# Patient Record
Sex: FEMALE | Race: WHITE | Employment: OTHER | URBAN - METROPOLITAN AREA
[De-identification: names, ages, dates, MRNs, and addresses within clinical notes are randomized per-mention and may not be internally consistent; named-entity substitution may affect disease eponyms.]

---

## 2018-05-24 PROBLEM — M76.891 ENTHESOPATHY OF RIGHT HIP: Status: ACTIVE | Noted: 2018-05-24

## 2019-03-04 PROBLEM — F41.1 GAD (GENERALIZED ANXIETY DISORDER): Status: ACTIVE | Noted: 2019-03-04

## 2019-12-19 PROBLEM — N18.30 CKD (CHRONIC KIDNEY DISEASE) STAGE 3, GFR 30-59 ML/MIN (HCC): Status: ACTIVE | Noted: 2019-12-19

## 2020-10-05 ENCOUNTER — HOSPITAL ENCOUNTER (EMERGENCY)
Facility: HOSPITAL | Age: 70
Discharge: HOME | End: 2020-10-05
Attending: EMERGENCY MEDICINE
Payer: MEDICARE

## 2020-10-05 VITALS
RESPIRATION RATE: 18 BRPM | SYSTOLIC BLOOD PRESSURE: 124 MMHG | HEIGHT: 63 IN | HEART RATE: 78 BPM | DIASTOLIC BLOOD PRESSURE: 86 MMHG | OXYGEN SATURATION: 97 % | TEMPERATURE: 99 F | BODY MASS INDEX: 26.58 KG/M2 | WEIGHT: 150 LBS

## 2020-10-05 DIAGNOSIS — R09.81 NASAL CONGESTION: Primary | ICD-10-CM

## 2020-10-05 PROCEDURE — 93005 ELECTROCARDIOGRAM TRACING: CPT

## 2020-10-05 PROCEDURE — 93010 ELECTROCARDIOGRAM REPORT: CPT

## 2020-10-05 PROCEDURE — 99284 EMERGENCY DEPT VISIT MOD MDM: CPT

## 2020-10-05 PROCEDURE — 99283 EMERGENCY DEPT VISIT LOW MDM: CPT

## 2020-10-05 NOTE — ED INITIAL ASSESSMENT (HPI)
Pt c/o head congestion, sinus pressure, headache, not feeling well. Pt hugged a friend last Monday and her friend dx +covid on Wednesday. Pt fears she has covid and wants testing.

## 2020-10-05 NOTE — ED PROVIDER NOTES
Patient Seen in: [facility] Emergency Department      History   Patient presents with:  Cough/URI  Fatigue    Stated Complaint: head congestion, covid exposure, fatigue    HPI    Patient is a 23-year-old female comes emergency room for evaluation of bilaterally. No sinus tenderness to percussion. Sinuses nontender to percussion  NECK: Supple, trachea midline, no lymphadenopathy. LUNG: Lungs clear to auscultation bilaterally, no wheezing, no rales, no rhonchi.   CARDIOVASCULAR: Regular rate and rhyt

## 2023-09-22 RX ORDER — POLYETHYLENE GLYCOL 3350 17 G/17G
17 POWDER, FOR SOLUTION ORAL DAILY
COMMUNITY

## 2023-09-22 RX ORDER — CHOLECALCIFEROL (VITAMIN D3) 50 MCG
TABLET ORAL DAILY
COMMUNITY

## 2023-09-22 RX ORDER — MULTIVIT,CALC,MINS/IRON/FOLIC 9MG-400MCG
TABLET ORAL
COMMUNITY

## 2023-09-28 ENCOUNTER — HOSPITAL ENCOUNTER (OUTPATIENT)
Facility: HOSPITAL | Age: 73
Setting detail: HOSPITAL OUTPATIENT SURGERY
Discharge: HOME | End: 2023-09-28
Attending: INTERNAL MEDICINE | Admitting: INTERNAL MEDICINE
Payer: MEDICARE

## 2023-09-28 ENCOUNTER — ANESTHESIA (OUTPATIENT)
Dept: ENDOSCOPY | Facility: HOSPITAL | Age: 73
End: 2023-09-28
Payer: MEDICARE

## 2023-09-28 ENCOUNTER — ANESTHESIA EVENT (OUTPATIENT)
Dept: ENDOSCOPY | Facility: HOSPITAL | Age: 73
End: 2023-09-28
Payer: MEDICARE

## 2023-09-28 VITALS
OXYGEN SATURATION: 98 % | DIASTOLIC BLOOD PRESSURE: 62 MMHG | TEMPERATURE: 98 F | WEIGHT: 140 LBS | HEART RATE: 64 BPM | RESPIRATION RATE: 20 BRPM | BODY MASS INDEX: 24.8 KG/M2 | SYSTOLIC BLOOD PRESSURE: 109 MMHG | HEIGHT: 63 IN

## 2023-09-28 PROCEDURE — 88305 TISSUE EXAM BY PATHOLOGIST: CPT | Performed by: INTERNAL MEDICINE

## 2023-09-28 PROCEDURE — 0DBP8ZX EXCISION OF RECTUM, VIA NATURAL OR ARTIFICIAL OPENING ENDOSCOPIC, DIAGNOSTIC: ICD-10-PCS | Performed by: INTERNAL MEDICINE

## 2023-09-28 RX ORDER — LIDOCAINE HYDROCHLORIDE 10 MG/ML
INJECTION, SOLUTION EPIDURAL; INFILTRATION; INTRACAUDAL; PERINEURAL AS NEEDED
Status: DISCONTINUED | OUTPATIENT
Start: 2023-09-28 | End: 2023-09-28 | Stop reason: SURG

## 2023-09-28 RX ORDER — SODIUM CHLORIDE, SODIUM LACTATE, POTASSIUM CHLORIDE, CALCIUM CHLORIDE 600; 310; 30; 20 MG/100ML; MG/100ML; MG/100ML; MG/100ML
INJECTION, SOLUTION INTRAVENOUS CONTINUOUS
Status: DISCONTINUED | OUTPATIENT
Start: 2023-09-28 | End: 2023-09-28

## 2023-09-28 RX ORDER — LABETALOL HYDROCHLORIDE 5 MG/ML
5 INJECTION, SOLUTION INTRAVENOUS EVERY 5 MIN PRN
Status: DISCONTINUED | OUTPATIENT
Start: 2023-09-28 | End: 2023-09-28

## 2023-09-28 RX ORDER — NALOXONE HYDROCHLORIDE 0.4 MG/ML
80 INJECTION, SOLUTION INTRAMUSCULAR; INTRAVENOUS; SUBCUTANEOUS AS NEEDED
Status: DISCONTINUED | OUTPATIENT
Start: 2023-09-28 | End: 2023-09-28

## 2023-09-28 RX ORDER — METOCLOPRAMIDE HYDROCHLORIDE 5 MG/ML
10 INJECTION INTRAMUSCULAR; INTRAVENOUS AS NEEDED
Status: DISCONTINUED | OUTPATIENT
Start: 2023-09-28 | End: 2023-09-28

## 2023-09-28 RX ORDER — ONDANSETRON 2 MG/ML
4 INJECTION INTRAMUSCULAR; INTRAVENOUS AS NEEDED
Status: DISCONTINUED | OUTPATIENT
Start: 2023-09-28 | End: 2023-09-28

## 2023-09-28 RX ADMIN — SODIUM CHLORIDE, SODIUM LACTATE, POTASSIUM CHLORIDE, CALCIUM CHLORIDE: 600; 310; 30; 20 INJECTION, SOLUTION INTRAVENOUS at 14:11:00

## 2023-09-28 RX ADMIN — SODIUM CHLORIDE, SODIUM LACTATE, POTASSIUM CHLORIDE, CALCIUM CHLORIDE: 600; 310; 30; 20 INJECTION, SOLUTION INTRAVENOUS at 13:56:00

## 2023-09-28 RX ADMIN — LIDOCAINE HYDROCHLORIDE 50 MG: 10 INJECTION, SOLUTION EPIDURAL; INFILTRATION; INTRACAUDAL; PERINEURAL at 13:56:00

## 2023-09-28 NOTE — DISCHARGE INSTRUCTIONS
ENDOSCOPY DISCHARGE INSTRUCTIONS    Procedure Performed:   Colonoscopy    Endoscopist: No name on file  FINDINGS:   Internal hemorrhoids, Diverticulosis (pockets in colon that develop with age and lack of fiber intake), and Colon polyp (a growth in the colon)    MEDICATIONS:  You may resume all other medications today    DIET:  Resume Normal Diet    BIOPSIES:  Biopsy results will be released to you as soon as they are available as is now the law. This will not allow your physician the opportunity to go over these before they are released to you. It is not necessary to contact the office for explanation of these results. Your physician will contact you within a few business days of their release to review the findings with you    X-RAYS/LABS:   No X-rays/Labs were ordered today    ADDITIONAL RECOMMENDATIONS:    - continue levothyroxine and recheck with pcp  - squatty potty   - 1 tbsp citrucel in 8 ounces liquid daily  - 1 capful miralax in 8 ounces water three times daily  - if normal and no improvement, try linzess 145 mcg daily    Activity for remainder of today:    REST TODAY  DO NOT drive or operate heavy machinery  DO NOT drink any alcoholic beverages  DO NOT sign any legal documents or make any important decisions    After your procedure(s): It is not unusual to feel bloated or gassy . Passing gas and belching is encouraged. Lying on your left side with your knees flexed may relieve the discomfort. A hot pack to the abdomen may also help. After your gastroscopy (upper endoscopy): You may experience a slight sore throat which will subside. Throat lozenges or salt water gargle can be used.     FOLLOW-UP:  Contact the office at [phone] for follow-up appointment is needed or if you develop any of the following:    Severe abdominal pain/discomfort     Excessive bleeding                     Black tarry stool    Difficulty breathing/swallowing      Persistent nausea/vomiting  Fever above 100 degrees or chills

## 2023-09-28 NOTE — OPERATIVE REPORT
Colonoscopy Operative Report    Zahira Joe Patient Status:  Hospital Outpatient Surgery    1950 MRN [id number]   Location [address] [address] Attending Araceli Walker MD   Psychiatric Day #   0 PCP Hola Olsen MD   DOS 23  Pre-Operative Diagnosis: CHRONIC IDIOPATHIC CONSTIPATION    Post-Operative Diagnosis:  Sigmoid diverticulosis  One 2 mm rectal polyp resected biopsy forceps  Small internal hemorrhoids    Procedure Performed: Procedure(s):  COLONOSCOPY , polypectomy    Informed Consent: Informed consent for both the procedure and sedation were obtained from the patient. The potentially life-threatening complications of sedation, bleeding,  perforation, transfusion or repeat endoscopy  were reviewed along with the possible need for hospitalization, surgical management, transfusion or repeat endoscopy should one of these complications arise. The patient understands and is agreeable to proceed. Sedation Type: MAC-Patient received sedation with monitored anesthesia provided by an anesthesiologist      Cecum Withdrawal Time:  6 minutes  Date of previous colonoscopy: none    Procedure Description: The patient was placed in the left lateral decubitus position. After careful digital rectal examination, the Pediatric colonoscope was inserted into the rectum and advanced to the level of the cecum under direct visualization. The cecum was identified by landmarks, including the appendiceal orifice and ileoceccal valve. Careful examination of the entire colon was performed during withdrawal of the endoscope. The scope was withdrawn to the rectum and retroflexion was performed. The patient tolerated the procedure well with no immediate complications. The patient was transferred to the recovery area in stable condition.   Quality of Preparation: Adequate  Aronchick Bowel Prep Scale:  good  Findings: Sigmoid diverticulosis  One 2 mm rectal polyp resected biopsy forceps  Small internal hemorrhoids  Recommendations: await pathology  Discharge: The patient was given an after visit summary detailing the procedure, findings, recommendations and follow up plans.      Case Barillas MD  9/28/2023  1:46 PM

## 2023-09-28 NOTE — ANESTHESIA POSTPROCEDURE EVALUATION
[address] Patient Status:  Hospital Outpatient Surgery   Age/Gender 68year old female MRN [id number]   Location [address] [address] Attending Ghada Mcmanus MD   Hosp Day # 0 PCP Heidi Campoverde MD       Anesthesia Post-op Note    COLONOSCOPY WITH FORCEP POLYPECTOMY    Procedure Summary       Date: 09/28/23 Room / Location: [facility] ENDOSCOPY 03 / [facility] ENDOSCOPY    Anesthesia Start: 6417 Anesthesia Stop: 8328    Procedure: COLONOSCOPY WITH FORCEP POLYPECTOMY Diagnosis: (DIVERTICULOSIS, COLON POLYPS)    Surgeons: Ghada Mcmanus MD Anesthesiologist: Salty Somers MD    Anesthesia Type: MAC ASA Status: 2            Anesthesia Type: MAC    Vitals Value Taken Time   /60 09/28/23 1423   Temp  09/28/23 1424   Pulse 73 09/28/23 1424   Resp 16 09/28/23 1424   SpO2 100 % 09/28/23 1424   Vitals shown include unfiled device data. Patient Location: Endoscopy    Anesthesia Type: MAC    Airway Patency: patent    Postop Pain Control: adequate    Mental Status: preanesthetic baseline    Nausea/Vomiting: none    Cardiopulmonary/Hydration status: stable euvolemic    Complications: no apparent anesthesia related complications    Postop vital signs: stable    Dental Exam: Unchanged from Preop    Patient to be discharged home when criteria met.

## 2023-09-28 NOTE — H&P
Enzo Rocha Patient Status:  Hospital Outpatient Surgery    1950 MRN [id number]   Location [address] [address] Attending Akira Joya MD   [facility] Day # 0 PCP Argenis Crawley MD     Date:  2023  Date of Admission:  2023    History provided by:patient  Chief Complaint:    change in bowel habits      HPI:   Dotty Wang is a(n) 68year old female. Here for change in bowel habits    History     Past Medical History:   Diagnosis Date    Anxiety state     Bipolar 1 disorder (HCC)     Hx of motion sickness     Hyperlipidemia     Hypothyroid     Renal disorder     RESOLVED    Visual impairment     GLASSES/CONTACTS     Past Surgical History:   Procedure Laterality Date    CATARACT EXTRACTION W/ INTRAOCULAR LENS IMPLANT      SKIN SURGERY  2019    Mohs/L superior eyebrow BCC/ done by MM    WISDOM TEETH REMOVED       Family History   Problem Relation Age of Onset    Other (Other) Father         cva    Heart Disorder Mother         chf    Breast Cancer Sister 64        dx age 64     Social History:  Social History     Socioeconomic History    Marital status: Single   Tobacco Use    Smoking status: Former     Years: 15     Types: Cigarettes    Smokeless tobacco: Never   Vaping Use    Vaping Use: Never used   Substance and Sexual Activity    Alcohol use: No    Drug use: No     Allergies/Medications: Allergies:   Penicillins             HIVES  Cholecalciferol (VITAMIN D) 50 MCG ( UT) Oral Tab, Take by mouth daily. Zinc Oxide-Vitamin C (ZINC PLUS VITAMIN C OR), Take by mouth. Multiple Vitamins-Minerals (GNP HAIR/SKIN/NAILS) Oral Tab, Take by mouth. Multiple Vitamin (MULTIVITAMIN TAB/CAP), Take 1 tablet by mouth daily. polyethylene glycol, PEG 3350, 17 g Oral Powd Pack, Take 17 g by mouth daily.   hydrocortisone 2.5 % External Cream, Apply twice a day  LEVOTHYROXINE 25 MCG Oral Tab, TAKE 1 TABLET BEFORE BREAKFAST  mupirocin 2 % External Ointment, Apply 1 Application topically 3 (three) times daily. CALCIUM CITRATE-VITAMIN D OR,   QUEtiapine 50 MG Oral Tab, Take 1.5 tablets (75 mg total) by mouth nightly. Review of Systems:   Pertinent items are noted in HPI. A comprehensive review of systems was negative. Physical Exam:   Vital Signs:  Height 5' 3\" (1.6 m), weight 140 lb (63.5 kg), not currently breastfeeding. General appearance:  alert, appears stated age, and cooperative  Head: Normocephalic, without obvious abnormality, atraumatic  Pulmonary: clear to auscultation bilaterally  Cardiovascular: S1, S2 normal, no murmur, click, rub or gallop, regular rate and rhythm  Abdominal: soft, non-tender; bowel sounds normal; no masses,  no organomegaly  Extremities: extremities normal, atraumatic, no cyanosis or edema        Results:     Lab Results   Component Value Date    CREATSERUM 1.12 (H) 04/08/2021    BUN 25.0 (H) 04/08/2021     04/08/2021    K 4.28 04/08/2021     04/08/2021    CO2 28.3 04/08/2021    GLU 97 04/08/2021    CA 9.7 04/08/2021    ALB 4.1 04/08/2021    ALKPHO 84 04/08/2021    BILT 0.56 04/08/2021    TP 7.0 04/08/2021    AST 18 04/08/2021    ALT 18 04/08/2021    TSH 3.340 04/08/2021       No results found.         Assessment/Plan:   colonoscopy      Raya Mariano MD  9/28/2023

## 2025-06-17 PROCEDURE — 4A023N7 MEASUREMENT OF CARDIAC SAMPLING AND PRESSURE, LEFT HEART, PERCUTANEOUS APPROACH: ICD-10-PCS | Performed by: INTERNAL MEDICINE

## 2025-06-17 PROCEDURE — B2151ZZ FLUOROSCOPY OF LEFT HEART USING LOW OSMOLAR CONTRAST: ICD-10-PCS | Performed by: INTERNAL MEDICINE

## 2025-06-17 PROCEDURE — B2111ZZ FLUOROSCOPY OF MULTIPLE CORONARY ARTERIES USING LOW OSMOLAR CONTRAST: ICD-10-PCS | Performed by: INTERNAL MEDICINE

## 2025-06-17 PROCEDURE — 4A033BC MEASUREMENT OF ARTERIAL PRESSURE, CORONARY, PERCUTANEOUS APPROACH: ICD-10-PCS | Performed by: INTERNAL MEDICINE

## 2025-06-18 ENCOUNTER — APPOINTMENT (OUTPATIENT)
Dept: CT IMAGING | Facility: HOSPITAL | Age: 75
End: 2025-06-18
Attending: EMERGENCY MEDICINE
Payer: MEDICARE

## 2025-06-18 ENCOUNTER — HOSPITAL ENCOUNTER (INPATIENT)
Facility: HOSPITAL | Age: 75
LOS: 2 days | Discharge: HOME | End: 2025-06-20
Attending: EMERGENCY MEDICINE
Payer: MEDICARE

## 2025-06-18 DIAGNOSIS — R07.9 ACUTE CHEST PAIN: Primary | ICD-10-CM

## 2025-06-18 DIAGNOSIS — I21.4 NSTEMI (NON-ST ELEVATED MYOCARDIAL INFARCTION) (HCC): ICD-10-CM

## 2025-06-18 LAB
ALBUMIN SERPL-MCNC: 4.2 G/DL (ref 3.2–4.8)
ALBUMIN/GLOB SERPL: 1.5 {RATIO} (ref 1–2)
ALP LIVER SERPL-CCNC: 91 U/L (ref 55–142)
ALT SERPL-CCNC: 19 U/L (ref 10–49)
ANION GAP SERPL CALC-SCNC: 9 MMOL/L (ref 0–18)
APTT PPP: 25.1 SECONDS (ref 23–36)
AST SERPL-CCNC: 23 U/L (ref ?–34)
BASOPHILS # BLD AUTO: 0.06 X10(3) UL (ref 0–0.2)
BASOPHILS NFR BLD AUTO: 1.1 %
BILIRUB SERPL-MCNC: 0.4 MG/DL (ref 0.2–1.1)
BUN BLD-MCNC: 24 MG/DL (ref 9–23)
CALCIUM BLD-MCNC: 9.9 MG/DL (ref 8.7–10.6)
CHLORIDE SERPL-SCNC: 103 MMOL/L (ref 98–112)
CHOLEST SERPL-MCNC: 219 MG/DL (ref ?–200)
CO2 SERPL-SCNC: 29 MMOL/L (ref 21–32)
CREAT BLD-MCNC: 1.3 MG/DL (ref 0.55–1.02)
EGFRCR SERPLBLD CKD-EPI 2021: 43 ML/MIN/1.73M2 (ref 60–?)
EOSINOPHIL # BLD AUTO: 0.16 X10(3) UL (ref 0–0.7)
EOSINOPHIL NFR BLD AUTO: 2.9 %
ERYTHROCYTE [DISTWIDTH] IN BLOOD BY AUTOMATED COUNT: 13.3 %
GLOBULIN PLAS-MCNC: 2.8 G/DL (ref 2–3.5)
GLUCOSE BLD-MCNC: 95 MG/DL (ref 70–99)
HCT VFR BLD AUTO: 39.1 % (ref 35–48)
HDLC SERPL-MCNC: 72 MG/DL (ref 40–59)
HGB BLD-MCNC: 13.2 G/DL (ref 12–16)
IMM GRANULOCYTES # BLD AUTO: 0 X10(3) UL (ref 0–1)
IMM GRANULOCYTES NFR BLD: 0 %
LDLC SERPL CALC-MCNC: 127 MG/DL (ref ?–100)
LYMPHOCYTES # BLD AUTO: 2.27 X10(3) UL (ref 1–4)
LYMPHOCYTES NFR BLD AUTO: 41.4 %
MCH RBC QN AUTO: 31.5 PG (ref 26–34)
MCHC RBC AUTO-ENTMCNC: 33.8 G/DL (ref 31–37)
MCV RBC AUTO: 93.3 FL (ref 80–100)
MONOCYTES # BLD AUTO: 0.56 X10(3) UL (ref 0.1–1)
MONOCYTES NFR BLD AUTO: 10.2 %
NEUTROPHILS # BLD AUTO: 2.43 X10 (3) UL (ref 1.5–7.7)
NEUTROPHILS # BLD AUTO: 2.43 X10(3) UL (ref 1.5–7.7)
NEUTROPHILS NFR BLD AUTO: 44.4 %
NONHDLC SERPL-MCNC: 147 MG/DL (ref ?–130)
OSMOLALITY SERPL CALC.SUM OF ELEC: 296 MOSM/KG (ref 275–295)
PLATELET # BLD AUTO: 252 10(3)UL (ref 150–450)
POTASSIUM SERPL-SCNC: 3.8 MMOL/L (ref 3.5–5.1)
PROT SERPL-MCNC: 7 G/DL (ref 5.7–8.2)
RBC # BLD AUTO: 4.19 X10(6)UL (ref 3.8–5.3)
SODIUM SERPL-SCNC: 141 MMOL/L (ref 136–145)
TRIGL SERPL-MCNC: 114 MG/DL (ref 30–149)
TROPONIN I SERPL HS-MCNC: 292 NG/L (ref ?–34)
VLDLC SERPL CALC-MCNC: 20 MG/DL (ref 0–30)
WBC # BLD AUTO: 5.5 X10(3) UL (ref 4–11)

## 2025-06-18 PROCEDURE — 99285 EMERGENCY DEPT VISIT HI MDM: CPT

## 2025-06-18 PROCEDURE — 80053 COMPREHEN METABOLIC PANEL: CPT | Performed by: EMERGENCY MEDICINE

## 2025-06-18 PROCEDURE — 85025 COMPLETE CBC W/AUTO DIFF WBC: CPT

## 2025-06-18 PROCEDURE — 85730 THROMBOPLASTIN TIME PARTIAL: CPT | Performed by: EMERGENCY MEDICINE

## 2025-06-18 PROCEDURE — 80053 COMPREHEN METABOLIC PANEL: CPT

## 2025-06-18 PROCEDURE — 84484 ASSAY OF TROPONIN QUANT: CPT | Performed by: EMERGENCY MEDICINE

## 2025-06-18 PROCEDURE — 85025 COMPLETE CBC W/AUTO DIFF WBC: CPT | Performed by: EMERGENCY MEDICINE

## 2025-06-18 PROCEDURE — 93010 ELECTROCARDIOGRAM REPORT: CPT

## 2025-06-18 PROCEDURE — 71275 CT ANGIOGRAPHY CHEST: CPT | Performed by: EMERGENCY MEDICINE

## 2025-06-18 PROCEDURE — 93005 ELECTROCARDIOGRAM TRACING: CPT

## 2025-06-18 PROCEDURE — 96365 THER/PROPH/DIAG IV INF INIT: CPT

## 2025-06-18 PROCEDURE — 80061 LIPID PANEL: CPT | Performed by: EMERGENCY MEDICINE

## 2025-06-18 PROCEDURE — 84484 ASSAY OF TROPONIN QUANT: CPT

## 2025-06-18 RX ORDER — ASPIRIN 81 MG/1
81 TABLET, CHEWABLE ORAL DAILY
Status: DISCONTINUED | OUTPATIENT
Start: 2025-06-19 | End: 2025-06-20

## 2025-06-18 RX ORDER — HEPARIN SODIUM AND DEXTROSE 10000; 5 [USP'U]/100ML; G/100ML
12 INJECTION INTRAVENOUS ONCE
Status: COMPLETED | OUTPATIENT
Start: 2025-06-18 | End: 2025-06-19

## 2025-06-18 RX ORDER — ATORVASTATIN CALCIUM 40 MG/1
40 TABLET, FILM COATED ORAL NIGHTLY
Status: DISCONTINUED | OUTPATIENT
Start: 2025-06-18 | End: 2025-06-20

## 2025-06-18 RX ORDER — LEVOTHYROXINE SODIUM 75 UG/1
75 TABLET ORAL
Status: DISCONTINUED | OUTPATIENT
Start: 2025-06-19 | End: 2025-06-20

## 2025-06-18 RX ORDER — QUETIAPINE FUMARATE 25 MG/1
75 TABLET, FILM COATED ORAL NIGHTLY
Status: DISCONTINUED | OUTPATIENT
Start: 2025-06-18 | End: 2025-06-20

## 2025-06-18 RX ORDER — ASPIRIN 81 MG/1
324 TABLET, CHEWABLE ORAL ONCE
Status: COMPLETED | OUTPATIENT
Start: 2025-06-18 | End: 2025-06-18

## 2025-06-18 RX ORDER — HEPARIN SODIUM AND DEXTROSE 10000; 5 [USP'U]/100ML; G/100ML
INJECTION INTRAVENOUS CONTINUOUS
Status: DISCONTINUED | OUTPATIENT
Start: 2025-06-19 | End: 2025-06-19

## 2025-06-18 RX ORDER — HEPARIN SODIUM 1000 [USP'U]/ML
60 INJECTION, SOLUTION INTRAVENOUS; SUBCUTANEOUS ONCE
Status: COMPLETED | OUTPATIENT
Start: 2025-06-18 | End: 2025-06-18

## 2025-06-18 RX ORDER — LEVOTHYROXINE SODIUM 75 UG/1
75 TABLET ORAL
COMMUNITY
Start: 2025-04-28

## 2025-06-19 ENCOUNTER — APPOINTMENT (OUTPATIENT)
Dept: INTERVENTIONAL RADIOLOGY/VASCULAR | Facility: HOSPITAL | Age: 75
End: 2025-06-19
Attending: INTERNAL MEDICINE
Payer: MEDICARE

## 2025-06-19 ENCOUNTER — APPOINTMENT (OUTPATIENT)
Dept: CV DIAGNOSTICS | Facility: HOSPITAL | Age: 75
End: 2025-06-19
Payer: MEDICARE

## 2025-06-19 LAB
ALBUMIN SERPL-MCNC: 3.8 G/DL (ref 3.2–4.8)
ALBUMIN/GLOB SERPL: 1.6 {RATIO} (ref 1–2)
ALP LIVER SERPL-CCNC: 77 U/L (ref 55–142)
ALT SERPL-CCNC: 15 U/L (ref 10–49)
ANION GAP SERPL CALC-SCNC: 10 MMOL/L (ref 0–18)
APTT PPP: 111.7 SECONDS (ref 23–36)
AST SERPL-CCNC: 20 U/L (ref ?–34)
BASOPHILS # BLD AUTO: 0.09 X10(3) UL (ref 0–0.2)
BASOPHILS NFR BLD AUTO: 1.8 %
BILIRUB SERPL-MCNC: 0.6 MG/DL (ref 0.2–1.1)
BUN BLD-MCNC: 17 MG/DL (ref 9–23)
CALCIUM BLD-MCNC: 9.2 MG/DL (ref 8.7–10.6)
CHLORIDE SERPL-SCNC: 106 MMOL/L (ref 98–112)
CO2 SERPL-SCNC: 26 MMOL/L (ref 21–32)
CREAT BLD-MCNC: 1.08 MG/DL (ref 0.55–1.02)
EGFRCR SERPLBLD CKD-EPI 2021: 54 ML/MIN/1.73M2 (ref 60–?)
EOSINOPHIL # BLD AUTO: 0.23 X10(3) UL (ref 0–0.7)
EOSINOPHIL NFR BLD AUTO: 4.7 %
ERYTHROCYTE [DISTWIDTH] IN BLOOD BY AUTOMATED COUNT: 13.4 %
GLOBULIN PLAS-MCNC: 2.4 G/DL (ref 2–3.5)
GLUCOSE BLD-MCNC: 91 MG/DL (ref 70–99)
HCT VFR BLD AUTO: 36.7 % (ref 35–48)
HGB BLD-MCNC: 12.6 G/DL (ref 12–16)
IMM GRANULOCYTES # BLD AUTO: 0.01 X10(3) UL (ref 0–1)
IMM GRANULOCYTES NFR BLD: 0.2 %
LYMPHOCYTES # BLD AUTO: 2.29 X10(3) UL (ref 1–4)
LYMPHOCYTES NFR BLD AUTO: 46.7 %
MAGNESIUM SERPL-MCNC: 2.1 MG/DL (ref 1.6–2.6)
MCH RBC QN AUTO: 32.2 PG (ref 26–34)
MCHC RBC AUTO-ENTMCNC: 34.3 G/DL (ref 31–37)
MCV RBC AUTO: 93.9 FL (ref 80–100)
MONOCYTES # BLD AUTO: 0.41 X10(3) UL (ref 0.1–1)
MONOCYTES NFR BLD AUTO: 8.4 %
NEUTROPHILS # BLD AUTO: 1.87 X10 (3) UL (ref 1.5–7.7)
NEUTROPHILS # BLD AUTO: 1.87 X10(3) UL (ref 1.5–7.7)
NEUTROPHILS NFR BLD AUTO: 38.2 %
OSMOLALITY SERPL CALC.SUM OF ELEC: 295 MOSM/KG (ref 275–295)
PLATELET # BLD AUTO: 237 10(3)UL (ref 150–450)
POTASSIUM SERPL-SCNC: 3.9 MMOL/L (ref 3.5–5.1)
PROT SERPL-MCNC: 6.2 G/DL (ref 5.7–8.2)
RBC # BLD AUTO: 3.91 X10(6)UL (ref 3.8–5.3)
SODIUM SERPL-SCNC: 142 MMOL/L (ref 136–145)
TROPONIN I SERPL HS-MCNC: 215 NG/L (ref ?–34)
WBC # BLD AUTO: 4.9 X10(3) UL (ref 4–11)

## 2025-06-19 PROCEDURE — 99153 MOD SED SAME PHYS/QHP EA: CPT | Performed by: INTERNAL MEDICINE

## 2025-06-19 PROCEDURE — 83735 ASSAY OF MAGNESIUM: CPT

## 2025-06-19 PROCEDURE — 93306 TTE W/DOPPLER COMPLETE: CPT

## 2025-06-19 PROCEDURE — 93799 UNLISTED CV SVC/PROCEDURE: CPT | Performed by: INTERNAL MEDICINE

## 2025-06-19 PROCEDURE — 84484 ASSAY OF TROPONIN QUANT: CPT

## 2025-06-19 PROCEDURE — 85730 THROMBOPLASTIN TIME PARTIAL: CPT

## 2025-06-19 PROCEDURE — 99152 MOD SED SAME PHYS/QHP 5/>YRS: CPT | Performed by: INTERNAL MEDICINE

## 2025-06-19 PROCEDURE — 93458 L HRT ARTERY/VENTRICLE ANGIO: CPT | Performed by: INTERNAL MEDICINE

## 2025-06-19 PROCEDURE — 80053 COMPREHEN METABOLIC PANEL: CPT

## 2025-06-19 PROCEDURE — 85025 COMPLETE CBC W/AUTO DIFF WBC: CPT

## 2025-06-19 RX ORDER — POTASSIUM CHLORIDE 1500 MG/1
40 TABLET, EXTENDED RELEASE ORAL ONCE
Status: COMPLETED | OUTPATIENT
Start: 2025-06-19 | End: 2025-06-19

## 2025-06-19 RX ORDER — SODIUM CHLORIDE 9 MG/ML
INJECTION, SOLUTION INTRAVENOUS
Status: DISCONTINUED | OUTPATIENT
Start: 2025-06-20 | End: 2025-06-19 | Stop reason: HOSPADM

## 2025-06-19 RX ORDER — POLYETHYLENE GLYCOL 3350 17 G/17G
17 POWDER, FOR SOLUTION ORAL DAILY PRN
Status: DISCONTINUED | OUTPATIENT
Start: 2025-06-19 | End: 2025-06-20

## 2025-06-19 RX ORDER — MIDAZOLAM HYDROCHLORIDE 1 MG/ML
INJECTION INTRAMUSCULAR; INTRAVENOUS
Status: COMPLETED
Start: 2025-06-19 | End: 2025-06-19

## 2025-06-19 RX ORDER — HEPARIN SODIUM 5000 [USP'U]/ML
INJECTION, SOLUTION INTRAVENOUS; SUBCUTANEOUS
Status: COMPLETED
Start: 2025-06-19 | End: 2025-06-19

## 2025-06-19 RX ORDER — VERAPAMIL HYDROCHLORIDE 2.5 MG/ML
INJECTION INTRAVENOUS
Status: COMPLETED
Start: 2025-06-19 | End: 2025-06-19

## 2025-06-19 RX ORDER — LIDOCAINE HYDROCHLORIDE 10 MG/ML
INJECTION, SOLUTION EPIDURAL; INFILTRATION; INTRACAUDAL; PERINEURAL
Status: COMPLETED
Start: 2025-06-19 | End: 2025-06-19

## 2025-06-19 RX ORDER — AMLODIPINE BESYLATE 2.5 MG/1
2.5 TABLET ORAL DAILY
Status: DISCONTINUED | OUTPATIENT
Start: 2025-06-19 | End: 2025-06-20

## 2025-06-19 RX ORDER — IOPAMIDOL 755 MG/ML
100 INJECTION, SOLUTION INTRAVASCULAR
Status: COMPLETED | OUTPATIENT
Start: 2025-06-19 | End: 2025-06-19

## 2025-06-19 RX ORDER — NITROGLYCERIN 20 MG/100ML
INJECTION INTRAVENOUS
Status: COMPLETED
Start: 2025-06-19 | End: 2025-06-19

## 2025-06-19 NOTE — PROGRESS NOTES
NURSING ADMISSION NOTE    Patient admitted via cart  Oriented to room  Safety precautions initiated  Bed in low position  Call light within reach  Skin check completed w/ PCT    Patient alert and oriented x 4. Up  SBA. On RA. NSR/SB on tele. Continent of bowel and bladder. No complaints of pain, shortness of breath or chest pain/discomfort. Heparin gtt infusing. POC : Heparin gtt, trend troponin, Cards to see, NPO for now. Fall precautions in place. Call light within reach.

## 2025-06-19 NOTE — PROGRESS NOTES
[facility] Hospitalist Progress Note     CC: Hospital Follow up    PCP: Linette Gentile MD       Subjective:     Seen and examined.  No chest pain this morning.  Planning for cardiac cath today.    OBJECTIVE:    Blood pressure 107/62, pulse 58, temperature 97.6 °F (36.4 °C), temperature source Oral, resp. rate 14, height 5' 3\" (1.6 m), weight 151 lb 12.8 oz (68.9 kg), SpO2 95%, not currently breastfeeding.    Temp:  [97.5 °F (36.4 °C)-97.9 °F (36.6 °C)] 97.6 °F (36.4 °C)  Pulse:  [54-72] 58  Resp:  [10-18] 14  BP: (103-142)/(58-78) 107/62  SpO2:  [92 %-100 %] 95 %      Intake/Output:    Intake/Output Summary (Last 24 hours) at 6/19/2025 1251  Last data filed at 6/19/2025 0833  Gross per 24 hour   Intake 69.74 ml   Output --   Net 69.74 ml       Last 3 Weights   06/19/25 0005 151 lb 12.8 oz (68.9 kg)   06/18/25 2041 151 lb (68.5 kg)   09/28/23 1330 140 lb (63.5 kg)   09/22/23 1811 140 lb (63.5 kg)   03/09/22 1346 160 lb (72.6 kg)       Exam   Gen: Alert, no acute distress  Heent: Normocephalic, atraumatic, neck supple, EOMI, PERRLA  Pulm: Lungs CTAB, normal respiratory effort  CV:  Regular rate and rhythm, no murmurs/rubs/gallops  Abd: Soft, nontender, nondistended, bowel sounds present  Extremities: No peripheral edema, no clubbing, pulses intact   Skin: No rashes or lesions  Neuro: AOx3, no focal neurologic deficits, CN II-XII grossly intact  Psych: appropriate mood and affect      Data Review:       Labs:     Recent Labs   Lab 06/18/25 2053 06/19/25  0635   RBC 4.19 3.91   HGB 13.2 12.6   HCT 39.1 36.7   MCV 93.3 93.9   MCH 31.5 32.2   MCHC 33.8 34.3   RDW 13.3 13.4   NEPRELIM 2.43 1.87   WBC 5.5 4.9   .0 237.0         Recent Labs   Lab 06/18/25 2053 06/19/25  0635   GLU 95 91   BUN 24* 17   CREATSERUM 1.30* 1.08*   EGFRCR 43* 54*   CA 9.9 9.2    142   K 3.8 3.9    106   CO2 29.0 26.0       Recent Labs   Lab 06/18/25 2053 06/19/25  0635   ALT 19 15   AST 23 20   ALB 4.2 3.8          Imaging:  CTA CHEST (CPT=71275)  Result Date: 6/18/2025  CONCLUSION:  1. No acute abnormality in the chest. 2. No evidence of pulmonary embolism.    LOCATION:  [facility]   Dictated by (CST): Onesimo Hill MD on 6/18/2025 at 10:07 PM     Finalized by (CST): Onesimo Hill MD on 6/18/2025 at 10:14 PM           Meds:     Scheduled Medications[1]  Medication Infusions[2]  PRN Medications[3]       Assessment/Plan:     74 year old female with PMH of anxiety, bipolar disorder, hyperlipidemia, hypothyroidism who is presenting to the hospital due to palpitations and chest pain.      NSTEMI with threat to bodily function  Chest pain, palpitations  - Concern for ACS, elevated troponin of 292-> repeat trop this morning 215  - Cardiology consulted  - Heparin GGT, monitor for toxicities with heparin assays  - Aspirin, atorvastatin  - Lipid panel, A1c ordered  - npo, cardiac cath today       Bipolar disorder  Anxiety  - Continue Seroquel     Hypothyroidism  - Continue levothyroxine     Dispo: Inpatient, pending cardiac cath.     Outpatient records reviewed. Questions/concerns were discussed with patient and/or family by bedside. Discussed with cardiology.     Dante Gonzalez [place] Hospitalist  Contact via [employer]      Supplementary Documentation:   DVT Mechanical Prophylaxis:   SCDs,    DVT Pharmacologic Prophylaxis   Medication    heparin (Porcine) 59509 units/250mL infusion ACS/AFIB CONTINUOUS      DVT Pharmacologic prophylaxis: Aspirin 162 mg         Code Status: Full Code  Larose: No urinary catheter in place  Larose Duration (in days):   Central line:    SADIA:                  [1]    [START ON 6/20/2025] sodium chloride   Intravenous On Call    levothyroxine  75 mcg Oral Before breakfast    QUEtiapine  75 mg Oral Nightly    atorvastatin  40 mg Oral Nightly    aspirin  81 mg Oral Daily   [2]    continuous dose heparin Stopped (06/19/25 1126)   [3]   iopamidol

## 2025-06-19 NOTE — PROGRESS NOTES
06/19/25 0005 06/19/25 0007 06/19/25 0011   Vital Signs   Pulse 69 68 59   Heart Rate Source Monitor  --   --    Resp 12  --   --    Respiratory Quality Normal  --   --    /71 109/62 106/64   MAP (mmHg) 82 78 79   BP Location Left arm Left arm Left arm   BP Method Automatic Automatic Automatic   Patient Position Lying Sitting Standing     Admission Ortho BP

## 2025-06-19 NOTE — PLAN OF CARE
Received patient from NOC RN at 0730. Patient A&O x4 and on room air, bilat lung sounds clear. Patient voiding without complications and is reporting no pain at this time, resting in bed. Fall precautions in place, call light and belongings within reach. Plan of care evolving.     POC:   - heparin gtt  - cards to see    Problem: Patient/Family Goals  Goal: Patient/Family Long Term Goal  Description: Patient's Long Term Goal: Stay out of hospital    Interventions:  - Follow up with PCP after discharge  - Take medications as prescribed  - See additional Care Plan goals for specific interventions  Outcome: Progressing  Goal: Patient/Family Short Term Goal  Description: Patient's Short Term Goal: Go home    Interventions:   - Test ordered  - Monitor las  - Monitor on tele  - See additional Care Plan goals for specific interventions  Outcome: Progressing     Problem: CARDIOVASCULAR - ADULT  Goal: Maintains optimal cardiac output and hemodynamic stability  Description: INTERVENTIONS:  - Monitor vital signs, rhythm, and trends  - Monitor for bleeding, hypotension and signs of decreased cardiac output  - Evaluate effectiveness of vasoactive medications to optimize hemodynamic stability  - Monitor arterial and/or venous puncture sites for bleeding and/or hematoma  - Assess quality of pulses, skin color and temperature  - Assess for signs of decreased coronary artery perfusion - ex. Angina  - Evaluate fluid balance, assess for edema, trend weights  Outcome: Progressing  Goal: Absence of cardiac arrhythmias or at baseline  Description: INTERVENTIONS:  - Continuous cardiac monitoring, monitor vital signs, obtain 12 lead EKG if indicated  - Evaluate effectiveness of antiarrhythmic and heart rate control medications as ordered  - Initiate emergency measures for life threatening arrhythmias  - Monitor electrolytes and administer replacement therapy as ordered  Outcome: Progressing

## 2025-06-19 NOTE — ED INITIAL ASSESSMENT (HPI)
Palpitations started last night with chest pressure with shortness of breath for about 4hrs. Went to pmd today where she had tests done and had an abnormal result but not sure what it was. Told to come to er.

## 2025-06-19 NOTE — ED QUICK NOTES
Orders for admission, patient is aware of plan and ready to go upstairs. Any questions, please call ED RN ranjeet at extension [phone]    Patient Covid vaccination status: Unvaccinated     COVID Test Ordered in ED: None    COVID Suspicion at Admission: N/A    Running Infusions: Medication Infusions[1] None    Mental Status/LOC at time of transport: oriented x4    Other pertinent information: no chest pain now   CIWA score: N/A   NIH score:  N/A             [1]

## 2025-06-19 NOTE — PAYOR COMM NOTE
--------------  ADMISSION REVIEW     Payor: [facility] [place] [place]  Subscriber #:  [id number]  Authorization Number: [id number]    Admit date: 6/18/25  Admit time: 11:56 PM       REVIEW DOCUMENTATION:     ED Provider Notes        Patient Seen in: [facility] Emergency Department          Stated Complaint: Abnormal results    Subjective:   Patient 74-year-old female presents emergency room for evaluation of palpitations and chest pain that occurred for 3 to 4 hours last night.  Patient did not seek medical care at that time.  Patient saw her doctor today who did blood work and found an elevated troponin and elevated D-dimer.  She was sent to the emergency room for further evaluation.  Patient reports no history of cardiac disease she reports this happened about a month ago and she prayed and it went away.  She denies any pain or pressure at this time patient in no distress    The history is provided by the patient and a friend.       Physical Exam    ED Triage Vitals [06/18/25 2041]   /78   Pulse 64   Resp 18   Temp 97.9 °F (36.6 °C)   Temp src Oral   SpO2 95 %   O2 Device None (Room air)       Current Vitals:   Vital Signs  BP: 113/58  Pulse: 62  Resp: 10  Temp: 97.9 °F (36.6 °C)  Temp src: Oral  MAP (mmHg): 76    Oxygen Therapy  SpO2: 96 %  O2 Device: None (Room air)        Physical Exam  Vitals and nursing note reviewed.   Constitutional:       General: She is not in acute distress.     Appearance: Normal appearance. She is normal weight. She is not ill-appearing, toxic-appearing or diaphoretic.   HENT:      Head: Normocephalic and atraumatic.      Nose: Nose normal.      Mouth/Throat:      Mouth: Mucous membranes are moist.   Eyes:      Extraocular Movements: Extraocular movements intact.      Pupils: Pupils are equal, round, and reactive to light.   Cardiovascular:      Rate and Rhythm: Normal rate and regular rhythm.      Pulses: Normal pulses.      Heart sounds: Normal heart sounds.   Pulmonary:      Effort:  Pulmonary effort is normal. No respiratory distress.      Breath sounds: Normal breath sounds. No wheezing.   Abdominal:      General: Bowel sounds are normal. There is no distension.      Palpations: Abdomen is soft.      Tenderness: There is no abdominal tenderness.   Musculoskeletal:         General: Normal range of motion.   Skin:     General: Skin is warm.      Capillary Refill: Capillary refill takes less than 2 seconds.   Neurological:      General: No focal deficit present.      Mental Status: She is alert and oriented to person, place, and time.   Psychiatric:         Mood and Affect: Mood normal.         Behavior: Behavior normal.          ED Course  Labs Reviewed   COMP METABOLIC PANEL (14) - Abnormal; Notable for the following components:       Result Value    BUN 24 (*)     Creatinine 1.30 (*)     Calculated Osmolality 296 (*)     eGFR-Cr 43 (*)     All other components within normal limits   TROPONIN I HIGH SENSITIVITY - Abnormal; Notable for the following components:    Troponin I (High Sensitivity) 292 (*)     All other components within normal limits   LIPID PANEL - Abnormal; Notable for the following components:    Cholesterol, Total 219 (*)     HDL Cholesterol 72 (*)     LDL Cholesterol 127 (*)     Non HDL Chol 147 (*)     All other components within normal limits   CBC WITH DIFFERENTIAL WITH PLATELET   PTT, ACTIVATED   RAINBOW DRAW LAVENDER   RAINBOW DRAW LIGHT GREEN   RAINBOW DRAW BLUE     EKG    Rate, intervals and axes as noted on EKG Report.  Rate: 57  Rhythm: Sinus bradycardia   Reading: Sinus bradycardia no ST elevation IL interval of 186 QRS of 82 QTc of 424 with axes of 56/14/34      CTA CHEST (CPT=71275)  Result Date: 6/18/2025  PROCEDURE:  CT ANGIOGRAPHY, CHEST (CPT=71275)  COMPARISON:  None.  INDICATIONS:  Abnormal results  TECHNIQUE:  IV contrast-enhanced multislice CT angiography is performed through the pulmonary arterial anatomy. 3D volume renderings are generated.  Dose reduction  techniques were used. Dose information is transmitted to the ACR (American College of Radiology) NRDR (National Radiology Data Registry) which includes the Dose Index Registry.  PATIENT STATED HISTORY:(As transcribed by Technologist)  Patient with palpitations beginning last night with chest pressure and shortness of breath.   CONTRAST USED:  100cc of Isovue 370  FINDINGS:  VASCULATURE:  No visible pulmonary arterial thrombus or attenuation.  THORACIC AORTA:  No aneurysm or visible dissection.  LUNGS:  Mild bibasilar atelectatic changes present.  No suspicious pulmonary nodules are identified. MEDIASTINUM:  No adenopathy or mass.  GABY:  No mass or adenopathy.  CARDIAC:  No enlargement, pericardial thickening, or significant coronary artery calcification. PLEURA:  No mass or effusion.  CHEST WALL:  No mass or axillary adenopathy.  LIMITED ABDOMEN:  Limited images of the upper abdomen are unremarkable.  BONES:  No bony lesion or fracture.  OTHER:  Negative.             CONCLUSION:  1. No acute abnormality in the chest. 2. No evidence of pulmonary embolism.    LOCATION:  [facility]   Dictated by (CST): Onesimo Hill MD on 6/18/2025 at 10:07 PM     Finalized by (CST): Onesimo Hill MD on 6/18/2025 at 10:14 PM           MDM     Testing ordered during this visit included CTA, repeat labs repeat troponin    Radiographic images  I personally reviewed the radiographs and my individual interpretation shows no PE  I also reviewed the official reports that showed CTA CHEST (CPT=71275)  Result Date: 6/18/2025  PROCEDURE:  CT ANGIOGRAPHY, CHEST (CPT=71275)  COMPARISON:  None.  INDICATIONS:  Abnormal results  TECHNIQUE:  IV contrast-enhanced multislice CT angiography is performed through the pulmonary arterial anatomy. 3D volume renderings are generated.  Dose reduction techniques were used. Dose information is transmitted to the ACR (American College of Radiology) NRDR (National Radiology Data Registry) which includes the Dose Index  Registry.  PATIENT STATED HISTORY:(As transcribed by Technologist)  Patient with palpitations beginning last night with chest pressure and shortness of breath.   CONTRAST USED:  100cc of Isovue 370  FINDINGS:  VASCULATURE:  No visible pulmonary arterial thrombus or attenuation.  THORACIC AORTA:  No aneurysm or visible dissection.  LUNGS:  Mild bibasilar atelectatic changes present.  No suspicious pulmonary nodules are identified. MEDIASTINUM:  No adenopathy or mass.  GABY:  No mass or adenopathy.  CARDIAC:  No enlargement, pericardial thickening, or significant coronary artery calcification. PLEURA:  No mass or effusion.  CHEST WALL:  No mass or axillary adenopathy.  LIMITED ABDOMEN:  Limited images of the upper abdomen are unremarkable.  BONES:  No bony lesion or fracture.  OTHER:  Negative.             CONCLUSION:  1. No acute abnormality in the chest. 2. No evidence of pulmonary embolism.    LOCATION:  [facility]   Dictated by (CST): Onesimo Hill MD on 6/18/2025 at 10:07 PM     Finalized by (CST): Onesimo Hill MD on 6/18/2025 at 10:14 PM           External chart review showed review of Care Everywhere in "[employer] system shows patient had positive troponin at 114 as outpatient and positive D-dimer    History obtained by an independent source included from patient, family    Discussion of management with patient, family, hospitalist, cardiology    Social determinants of health that affect care include not applicable      Medications Provided: Aspirin    Course of Events during Emergency Room Visit include 74-year-old female presents emergency room for elevated troponin as outpatient labs along with elevated D-dimer will repeat her troponin which is again further elevated.  Patient denies any pain currently her EKG is unremarkable.  Will get CTA to rule out pulmonary embolism and discussed patient with cardiology and hospitalist.  Plan for admission      Patient discussed with cardiology patient to be started on heparin.   [facility]    Disposition:    Admission  I have discussed with the patient the results of test, differential diagnosis, and treatment plan. They expressed clear understanding of these instructions and agrees to the plan provided.     Admission disposition: 6/18/2025 10:51 PM    Medical Decision Making      Disposition and Plan     Clinical Impression:  1. Acute chest pain    2. NSTEMI (non-ST elevated myocardial infarction) (HCC)           [facility] Hospitalist H&P         CC:        Chief Complaint   Patient presents with    Other       Snet here by MD- called and told her \"you need to go to the ER and get admitted - something with my heart.\"         PCP: Linette Gentile MD     History of Present Illness: Patient is a 74 year old female with PMH of anxiety, bipolar disorder, hyperlipidemia, hypothyroidism who is presenting to the hospital due to palpitations and chest pain.  Notes that symptoms started last night and did not seek medical care at that time.  Saw her doctor today who did blood work and found patient to have elevated troponin and D-dimer and was sent to the ER for further evaluation.     On arrival, vital stable, CBC unremarkable, CMP shows creatinine of 1.3 (baseline 1.1), troponin 292.  CTA chest negative for PE or any other acute abnormalities.  EKG shows sinus bradycardia with no ST/T wave abnormalities.  Started on heparin GGT, cardiology consulted, and patient was admitted for further evaluation.         ASSESSMENT / PLAN:      74 year old female with PMH of anxiety, bipolar disorder, hyperlipidemia, hypothyroidism who is presenting to the hospital due to palpitations and chest pain.      NSTEMI with threat to bodily function  Chest pain, palpitations  - Concern for ACS, elevated troponin of 292  - Cardiology consulted  - Heparin GGT, monitor for toxicities with heparin assays  - Aspirin, atorvastatin  - Lipid panel, A1c ordered  - N.p.o. for now,  possible stress test versus intervention tomorrow  - Repeat troponin     Bipolar disorder  Anxiety  - Continue Seroquel     Hypothyroidism  - Continue levothyroxine     Dispo: Inpatient     Outpatient or previous hospital records reviewed. Questions/concerns were discussed with patient and/or family by bedside.     Dante Gonzalez DO  [facility]  Hospitalist  Contact via dscout/Liquid Scenarios/MolecularMD      :    [facility] Cardiology  Consultation Note              Trina Victoria Patient Status:  Inpatient    1950 MRN [id number]   Location [facility] 2NE-A Attending Dante Gonzalez DO   Hosp Day # 1 PCP Linette Gentile MD      Impression:  1. chest pain/heart pounding--> mild NSTEMI, HS trop 200s; ECG sinus bradycardia without ST changes.     2. mild HL, remote smoker     Recommendations:  - 3rd similar episode in the past three years, twice in last month. Discussed stress test vs definitive coronary angiography, she is agreeable to proceeding with C.     The risks, benefits, and alternatives of cardiac catheterization were discussed. The risks included, but were not limited to: bleeding, allergic reaction, infection, stroke, myocardial infarction (heart attack), and death. Benefits of the procedure included: symptomatic improvement, diagnosis of heart disease and prevention of myocardial infarction. Alternatives to the procedure included: not performing cardiac catheterization, treatment with medications only, and observation.      **ADDENDUM 1307**  - LHC: LM-ok, LAD-ok, LCx-ok, RCA- 40%, iFR = 1.0.  LVEF 60-65%.  - no angiographic lesions to explain presentation symptoms.  Coronary vasospasm is a consideration, will empricially trial amlodipine 2.5mg daily.    - f/u TTE.  monitor overnight.       History of Present Illness:  Trina Victoria is a 74 year old female who presented to [facility] on 2025.     Seen in cardiology consultation for CP/NSTEMI.  No known cardiac  conditions; mild HL (ADR to statins), remote smoker.  4hr episode of mod-severe heart pounding and tightness across the top of her chest 1d PTA; couldn't sleep all night, but did not call EMS.  Symptoms abated, eventually saw PCP following day, HS trop mildly elevated; sent to ER.  repeat trop 292, ECG/tele- sinus bradycardia without acute ischemic changes.  Started heparin gtt, admitted.  Currently, symptom free.  Had a similar, more severe episode 4 weeks ago- and with further recollection, remembers an even more symptomatic episode nearly 3 yrs ago (thought she had food poisoning at that time).          6/19 HOSPITALIST:    [facility] Hospitalist Progress Note      CC: Hospital Follow up     PCP: Linette Gentile MD         Subjective:      Seen and examined.  No chest pain this morning.  Planning for cardiac cath today.     OBJECTIVE:     Blood pressure 107/62, pulse 58, temperature 97.6 °F (36.4 °C), temperature source Oral, resp. rate 14, height 5' 3\" (1.6 m), weight 151 lb 12.8 oz (68.9 kg), SpO2 95%, not currently breastfeeding.     Temp:  [97.5 °F (36.4 °C)-97.9 °F (36.6 °C)] 97.6 °F (36.4 °C)  Pulse:  [54-72] 58  Resp:  [10-18] 14  BP: (103-142)/(58-78) 107/62  SpO2:  [92 %-100 %] 95 %        Intake/Output:     Intake/Output Summary (Last 24 hours) at 6/19/2025 1251  Last data filed at 6/19/2025 0833      Gross per 24 hour   Intake 69.74 ml   Output --   Net 69.74 ml              Last 3 Weights   06/19/25 0005 151 lb 12.8 oz (68.9 kg)   06/18/25 2041 151 lb (68.5 kg)   09/28/23 1330 140 lb (63.5 kg)   09/22/23 1811 140 lb (63.5 kg)   03/09/22 1346 160 lb (72.6 kg)         Exam   Gen: Alert, no acute distress  Heent: Normocephalic, atraumatic, neck supple, EOMI, PERRLA  Pulm: Lungs CTAB, normal respiratory effort  CV:  Regular rate and rhythm, no murmurs/rubs/gallops  Abd: Soft, nontender, nondistended, bowel sounds present  Extremities: No peripheral edema, no clubbing, pulses intact    Skin: No rashes or lesions  Neuro: AOx3, no focal neurologic deficits, CN II-XII grossly intact  Psych: appropriate mood and affect        Data Review:       Labs:           Recent Labs   Lab 06/18/25 2053 06/19/25  0635   RBC 4.19 3.91   HGB 13.2 12.6   HCT 39.1 36.7   MCV 93.3 93.9   MCH 31.5 32.2   MCHC 33.8 34.3   RDW 13.3 13.4   NEPRELIM 2.43 1.87   WBC 5.5 4.9   .0 237.0                 Recent Labs   Lab 06/18/25 2053 06/19/25  0635   GLU 95 91   BUN 24* 17   CREATSERUM 1.30* 1.08*   EGFRCR 43* 54*   CA 9.9 9.2    142   K 3.8 3.9    106   CO2 29.0 26.0              Recent Labs   Lab 06/18/25 2053 06/19/25  0635   ALT 19 15   AST 23 20   ALB 4.2 3.8            Imaging:  CTA CHEST (CPT=71275)  Result Date: 6/18/2025  CONCLUSION:  1. No acute abnormality in the chest. 2. No evidence of pulmonary embolism.    LOCATION:  [facility]   Dictated by (CST): Onesimo Hill MD on 6/18/2025 at 10:07 PM     Finalized by (CST): Onesimo Hill MD on 6/18/2025 at 10:14 PM                Assessment/Plan:      74 year old female with PMH of anxiety, bipolar disorder, hyperlipidemia, hypothyroidism who is presenting to the hospital due to palpitations and chest pain.      NSTEMI with threat to bodily function  Chest pain, palpitations  - Concern for ACS, elevated troponin of 292-> repeat trop this morning 215  - Cardiology consulted  - Heparin GGT, monitor for toxicities with heparin assays  - Aspirin, atorvastatin  - Lipid panel, A1c ordered  - npo, cardiac cath today       Bipolar disorder  Anxiety  - Continue Seroquel     Hypothyroidism  - Continue levothyroxine     Dispo: Inpatient, pending cardiac cath.      Outpatient records reviewed. Questions/concerns were discussed with patient and/or family by bedside. Discussed with cardiology.      DO Su Aguilera [facility]  Hospitalist  Contact via GeeYee/KongZhong/Collecta        MEDICATIONS ADMINISTERED IN LAST 1 DAY:  aspirin chewable tab 324 mg        Date Action Dose Route User    6/18/2025 2153 Given 324 mg Oral Eneida Pimentel RN          atorvastatin (Lipitor) tab 40 mg       Date Action Dose Route User    6/19/2025 0021 Given 40 mg Oral Ivan Ghosh RN          heparin (Porcine) 1000 UNIT/ML injection - BOLUS IV 4,100 Units       Date Action Dose Route User    6/18/2025 2247 Given 4,100 Units Intravenous Eneida Pimentel RN          heparin (Porcine) 16421 units/250mL infusion ACS/AFIB CONTINUOUS       Date Action Dose Route User    6/19/2025 0511 Hi-Risk Other 800 Units/hr Intravenous Ivan Ghosh RN          heparin (Porcine) 29880 units/250mL infusion ED INITIAL DOSE       Date Action Dose Route User    6/18/2025 2248 New Bag 12 Units/kg/hr × 68.5 kg Intravenous Eneida Pimentel RN          iopamidol 76% (ISOVUE-370) injection for power injector       Date Action Dose Route User    6/18/2025 2147 Given 100 mL Intravenous Mary Byers          levothyroxine (Synthroid) tab 75 mcg       Date Action Dose Route User    6/19/2025 0636 Given 75 mcg Oral Ivan Ghosh RN          potassium chloride (Klor-Con M20) tab 40 mEq       Date Action Dose Route User    6/19/2025 0020 Given 40 mEq Oral Ivan Ghosh RN          QUEtiapine (SEROquel) tab 75 mg       Date Action Dose Route User    6/19/2025 0020 Given 75 mg Oral Ivan Ghosh RN            Vitals (last day)       Date/Time Temp Pulse Resp BP SpO2 Weight O2 Device O2 Flow Rate (L/min) [facility]    06/19/25 0427 97.6 °F (36.4 °C) 72 14 103/63 92 % -- None (Room air) 0 L/min     06/19/25 0011 -- 59 -- 106/64 98 % -- -- --     06/19/25 0007 -- 68 -- 109/62 -- -- -- --     06/19/25 0005 97.5 °F (36.4 °C) 69 12 110/71 97 % 151 lb 12.8 oz (68.9 kg) None (Room air) 0 L/min     06/18/25 2300 -- 62 14 108/66 100 % -- None (Room air) --     06/18/25 2245 -- 60 15 119/71 98 % -- None (Room air) --     06/18/25 2230 -- 62 10 113/58 96 % -- None (Room air) --     06/18/25 2130 -- 54 11 108/70 99 % -- None (Room air)  -- JAN    06/18/25 2041 97.9 °F (36.6 °C) 64 18 142/78 95 % 151 lb (68.5 kg) None (Room air) -- DV

## 2025-06-19 NOTE — H&P
[facility] Hospitalist H&P       CC:   Chief Complaint   Patient presents with    Other     Snet here by MD- called and told her \"you need to go to the ER and get admitted - something with my heart.\"        PCP: Linette Gentile MD    History of Present Illness: Patient is a 74 year old female with PMH of anxiety, bipolar disorder, hyperlipidemia, hypothyroidism who is presenting to the hospital due to palpitations and chest pain.  Notes that symptoms started last night and did not seek medical care at that time.  Saw her doctor today who did blood work and found patient to have elevated troponin and D-dimer and was sent to the ER for further evaluation.    On arrival, vital stable, CBC unremarkable, CMP shows creatinine of 1.3 (baseline 1.1), troponin 292.  CTA chest negative for PE or any other acute abnormalities.  EKG shows sinus bradycardia with no ST/T wave abnormalities.  Started on heparin GGT, cardiology consulted, and patient was admitted for further evaluation.    PMH  Past Medical History[1]     PSH  Past Surgical History[2]     ALL:  Allergies[3]     Home Medications:  Medications Taking[4]      Soc Hx  Social History     Tobacco Use    Smoking status: Former     Types: Cigarettes    Smokeless tobacco: Never   Substance Use Topics    Alcohol use: No        Fam Hx  Family History[5]    Review of Systems  Comprehensive ROS reviewed and negative except for what's stated above.     OBJECTIVE:  /66   Pulse 62   Temp 97.9 °F (36.6 °C) (Oral)   Resp 14   Ht 5' 3\" (1.6 m)   Wt 151 lb (68.5 kg)   SpO2 100%   BMI 26.75 kg/m²     Gen: Alert, no acute distress  Heent: Normocephalic, atraumatic, neck supple, EOMI, PERRLA  Pulm: Lungs CTAB, normal respiratory effort  CV:  Regular rate and rhythm, no murmurs/rubs/gallops  Abd: Soft, nontender, nondistended, bowel sounds present  Extremities: No peripheral edema, no clubbing, pulses intact   Skin: No rashes or lesions  Neuro: AOx3, no  focal neurologic deficits, CN II-XII grossly intact  Psych: appropriate mood and affect    Diagnostic Data:    CBC/Chem  Recent Labs   Lab 06/18/25 2053   WBC 5.5   HGB 13.2   MCV 93.3   .0       Recent Labs   Lab 06/18/25 2053      K 3.8      CO2 29.0   BUN 24*   CREATSERUM 1.30*   GLU 95   CA 9.9       Recent Labs   Lab 06/18/25 2053   ALT 19   AST 23   ALB 4.2       No results for input(s): \"TROP\" in the last 168 hours.    Additional Diagnostics: ECG: Independently reviewed and interpreted, shows sinus bradycardia with no ST/T wave abnormality.    Radiology: CTA CHEST (CPT=71275)  Result Date: 6/18/2025  CONCLUSION:  1. No acute abnormality in the chest. 2. No evidence of pulmonary embolism.    LOCATION:  [facility]   Dictated by (CST): Onesimo Hill MD on 6/18/2025 at 10:07 PM     Finalized by (CST): Onesimo Hill MD on 6/18/2025 at 10:14 PM           ASSESSMENT / PLAN:     74 year old female with PMH of anxiety, bipolar disorder, hyperlipidemia, hypothyroidism who is presenting to the hospital due to palpitations and chest pain.     NSTEMI with threat to bodily function  Chest pain, palpitations  - Concern for ACS, elevated troponin of 292  - Cardiology consulted  - Heparin GGT, monitor for toxicities with heparin assays  - Aspirin, atorvastatin  - Lipid panel, A1c ordered  - N.p.o. for now, possible stress test versus intervention tomorrow  - Repeat troponin    Bipolar disorder  Anxiety  - Continue Seroquel    Hypothyroidism  - Continue levothyroxine    Dispo: Inpatient    Outpatient or previous hospital records reviewed. Questions/concerns were discussed with patient and/or family by bedside.    DO Su Aguilera [facility] Hospitalist  Contact via [employer]/Sendio                        Advanced Care Planning    While discussing goals of care with the patient and their family, patient voluntarily participated in an advanced care planning discussion. The following was  discussed: Patient says that she would like to be a full code at this time.  Okay with all resuscitative care including CPR, ACLS, intubation.  She feels like she has been otherwise young and healthy, and would want everything done if her heart were to stop beating..  She is agreeable to the plan as stated above.    16 minutes were spent in discussing advanced care planning. This time was exclusive of the documented time for this visit.      Supplementary Documentation:   DVT Mechanical Prophylaxis:   SCDs,    DVT Pharmacologic Prophylaxis   Medication    heparin (Porcine) 25987 units/250mL infusion ED INITIAL DOSE    [START ON 6/19/2025] heparin (Porcine) 53892 units/250mL infusion ACS/AFIB CONTINUOUS                Code Status: Not on file  Larose: No urinary catheter in place  Larose Duration (in days):   Central line:    SADIA: 6/20/2025             [1]   Past Medical History:   Anxiety state    Bipolar 1 disorder (HCC)    Cancer (HCC)    skin    Hx of motion sickness    Hyperlipidemia    Hypothyroid    Renal disorder    RESOLVED    Visual impairment    GLASSES/CONTACTS   [2]   Past Surgical History:  Procedure Laterality Date    Cataract extraction w/ intraocular lens implant      Colonoscopy N/A 9/28/2023    Procedure: COLONOSCOPY WITH FORCEP POLYPECTOMY;  Surgeon: Gamal Krishnan MD;  Location:  ENDOSCOPY    Skin surgery  03/12/2019    Mohs/L superior eyebrow BCC/ done by MM    [place] teeth removed     [3]   Allergies  Allergen Reactions    Penicillins HIVES   [4]   Outpatient Medications Marked as Taking for the 6/18/25 encounter (Hospital Encounter)   Medication Sig Dispense Refill    levothyroxine 75 MCG Oral Tab Take 1 tablet (75 mcg total) by mouth before breakfast.      QUEtiapine 25 MG Oral Tab Take 3 tablets (75 mg total) by mouth nightly. 90 tablet 3    Cholecalciferol (VITAMIN D) 50 MCG (2000 UT) Oral Tab Take 1 tablet by mouth in the morning.      Multiple Vitamins-Minerals (GNP HAIR/SKIN/NAILS) Oral  Tab Take 1 tablet by mouth in the morning.      CALCIUM CITRATE-VITAMIN D OR Take 2 tablets by mouth in the morning.     [5]   Family History  Problem Relation Age of Onset    Other (Other) Father         cva    Heart Disorder Mother         chf    Breast Cancer Sister 61        dx age 61

## 2025-06-19 NOTE — CONSULTS
[facility] Cardiology  Consultation Note      Trina Victoria Patient Status:  Inpatient    1950 MRN [id number]   Location [facility] 2NE-A Attending Dante Gonzalez, DO   Hosp Day # 1 PCP Linette Gentile MD     Impression:  1. chest pain/heart pounding--> mild NSTEMI, HS trop 200s; ECG sinus bradycardia without ST changes.    2. mild HL, remote smoker    Recommendations:  - 3rd similar episode in the past three years, twice in last month. Discussed stress test vs definitive coronary angiography, she is agreeable to proceeding with [facility].    The risks, benefits, and alternatives of cardiac catheterization were discussed. The risks included, but were not limited to: bleeding, allergic reaction, infection, stroke, myocardial infarction (heart attack), and death. Benefits of the procedure included: symptomatic improvement, diagnosis of heart disease and prevention of myocardial infarction. Alternatives to the procedure included: not performing cardiac catheterization, treatment with medications only, and observation.     **ADDENDUM 1307**  - LHC: LM-ok, LAD-ok, LCx-ok, RCA- 40%, iFR = 1.0.  LVEF 60-65%.  - no angiographic lesions to explain presentation symptoms.  Coronary vasospasm is a consideration, will empricially trial amlodipine 2.5mg daily.    - f/u TTE.  monitor overnight.      History of Present Illness:  Trina Victoria is a 74 year old female who [facility] on [facility]25.    Seen in cardiology consultation for CP/NSTEMI.  No known cardiac conditions; mild HL (ADR to statins), remote smoker.  4hr episode of mod-severe heart pounding and tightness across the top of her chest 1d PTA; couldn't sleep all night, but did not call EMS.  Symptoms abated, eventually saw PCP following day, HS trop mildly elevated; sent to ER.  repeat trop 292, ECG/tele- sinus bradycardia without acute ischemic changes.  Started heparin gtt, admitted.  Currently, symptom free.  Had a similar, more  severe episode 4 weeks ago- and with further recollection, remembers an even more symptomatic episode nearly 3 yrs ago (thought she had food poisoning at that time).      Medications:  Current Hospital Medications[1]    Past Medical History[2]    Past Surgical History[3]    Family History  family history includes Breast Cancer (age of onset: 61) in her sister; Heart Disorder in her mother; Other in her father.    Social History   reports that she has quit smoking. Her smoking use included cigarettes. She has never used smokeless tobacco. She reports that she does not drink alcohol and does not use drugs.     Allergies  Allergies[4]      Review of Systems:  Constitutional: negative for fevers  Eyes: negative for visual disturbance  Ears, nose, mouth, throat, and face: negative for epistaxis  Respiratory: negative for dyspnea on exertion  Cardiovascular: negative for chest pain  Gastrointestinal: negative for melena  Genitourinary:negative for hematuria  Hematologic/lymphatic: negative for bleeding  Musculoskeletal:negative for myalgias  Neurological: negative for dizziness and headaches  Endocrine: negative for temperature intolerance      Physical Exam:  Blood pressure 107/62, pulse 58, temperature 97.6 °F (36.4 °C), temperature source Oral, resp. rate 14, height 5' 3\" (1.6 m), weight 151 lb 12.8 oz (68.9 kg), SpO2 95%, not currently breastfeeding.  Temp (24hrs), Av.7 °F (36.5 °C), Min:97.5 °F (36.4 °C), Max:97.9 °F (36.6 °C)    Wt Readings from Last 3 Encounters:   25 151 lb 12.8 oz (68.9 kg)   23 140 lb (63.5 kg)   22 160 lb (72.6 kg)       General: Awake and alert; in no acute distress  HEENT: Extraocular movements are intact; sclerae are anicteric; scalp is atrauamatic; no thyromegaly  Neck: Supple; no JVD; no carotid bruits  Cardiac: Regular rate and regular rhythm; no murmurs/rubs/gallops are appreciated; PMI is non-displaced; there is no evidence of a sternal heave  Lungs: Clear to  auscultation bilaterally; no accessory muscle use is noted  Abdomen: Soft, non-tender; bowel sounds are normoactive; no hepatosplenomegaly  Extremities: No clubbing or cyanosis; moves all 4 extremities normally  Psychiatric: Normal mood and affect; answers questions appropriately  Dermatologic: No rashes; normal skin turgor    Diagnostic testing:    EKG: Normal sinus rhythm    Labs:   No results found for: \"PT\", \"INR\"  Lab Results   Component Value Date     06/18/2025    HDL 72 06/18/2025    TRIG 114 06/18/2025    VLDL 20 06/18/2025     Lab Results   Component Value Date    WBC 4.9 06/19/2025    HGB 12.6 06/19/2025    HCT 36.7 06/19/2025    .0 06/19/2025    CREATSERUM 1.08 06/19/2025    BUN 17 06/19/2025     06/19/2025    K 3.9 06/19/2025     06/19/2025    CO2 26.0 06/19/2025    GLU 91 06/19/2025    CA 9.2 06/19/2025    ALB 3.8 06/19/2025    ALKPHO 77 06/19/2025    BILT 0.6 06/19/2025    TP 6.2 06/19/2025    AST 20 06/19/2025    ALT 15 06/19/2025    .7 06/19/2025    MG 2.1 06/19/2025         Thank you for allowing our practice to participate in the care of your patient. Please do not hesitate to contact me if you have any questions.           [1]   Current Facility-Administered Medications   Medication Dose Route Frequency    [START ON 6/20/2025] sodium chloride 0.9% infusion   Intravenous On Call    heparin (Porcine) 86815 units/250mL infusion ACS/AFIB CONTINUOUS  200-3,000 Units/hr Intravenous Continuous    levothyroxine (Synthroid) tab 75 mcg  75 mcg Oral Before breakfast    QUEtiapine (SEROquel) tab 75 mg  75 mg Oral Nightly    atorvastatin (Lipitor) tab 40 mg  40 mg Oral Nightly    aspirin chewable tab 81 mg  81 mg Oral Daily   [2]   Past Medical History:   Anxiety state    Bipolar 1 disorder (HCC)    Cancer (HCC)    skin    Hx of motion sickness    Hyperlipidemia    Hypothyroid    Renal disorder    RESOLVED    Visual impairment    GLASSES/CONTACTS   [3]   Past Surgical  History:  Procedure Laterality Date    Cataract extraction w/ intraocular lens implant      Colonoscopy N/A 9/28/2023    Procedure: COLONOSCOPY WITH FORCEP POLYPECTOMY;  Surgeon: Gamal Krishnan MD;  Location:  ENDOSCOPY    Skin surgery  03/12/2019    Mohs/L superior eyebrow BCC/ done by MM    [place] teeth removed     [4]   Allergies  Allergen Reactions    Penicillins HIVES

## 2025-06-19 NOTE — PROCEDURES
[facility]       Trina Victoria Location: Cath Lab    CSN [id number] MRN [id number]   Admission Date 6/18/2025 Procedure Date 6/19/2025   Attending Physician Dante Gonzalez DO Procedure Physician José Miguel Abad MD         CARDIAC CATHETERIZATION REPORT     PREOPERATIVE DIAGNOSIS:  chest pain, mild NSTEMI  POSTOPERATIVE DIAGNOSIS:  mild non-obstructive CAD (RCA).  PROCEDURE PERFORMED:  left heart catheterization, left ventriculogram, selective coronary angiography      PROCEDURE:  The patient was brought to the cardiac catheterization lab in the fasting state.  Informed consent was obtained.  Moderate sedation was employed using a total of IV Versed 4mg and IV fentanyl 75mcg.  I directly observed the patient from 1228 to 1300, for a total of 32 minutes, and an independent trained observer was present and assisted in the monitoring of the patient's level of consciousness and physiological status, watching the heart rate, blood pressure, oximetry, and rhythm, in addition to total moderation time.      ACCESS/CATHETER PLACEMENT:   The right radial area was prepped and draped in a sterile manner and anesthetized with 2% lidocaine.  The right radial artery was accessed, and a 6-Kittitian, 11 cm sheath was placed.  Left and right selective coronary angiography was performed using a 6F Tiger4.0 catheter.  A pigtail catheter was used to cross the aortic valve, measure left ventricular pressure and perform a 30 degree KINNEY ventriculogram.  The catheter was pulled across the valve to assess for aortic stenosis.  At the conclusion of the study, the right radial artery hemostasis was performed using a radial band.         FINDINGS:      1.  Left heart catheterization:    Left ventricle: 97/12 mmHg  Aorta: 93/50/68 mmHg  Left ventriculogram demonstrated a LV ejection fraction of 60-65% without significant mitral regurgitation; there are no regional wall motion abnormalities.  There was no aortic stenosis upon pullback of the  catheter.    2.  Selective coronary angiography:      Left main artery: The left main artery is a medium, bifurcating vessel without significant angiographic disease.     LAD:  The left anterior descending artery is a medium size vessel that wraps around the apex and gives rise to a mid diagonal branch.  Mild, mid intra-myocardial bridging.  No significant angiographic disease.    LCx: The left circumflex artery is a medium size vessel that gives rise to a mid obtuse marginal.  No significant angiographic disease.     RCA:  The right coronary artery is a medium size, dominant vessel that gives rise to a small, short rPDA.  There is mild 40% diffuse, eccentric plaque of the mid RCA.    INTERVENTIONAL PROCEDURE: Heparin was used for systemic anticoagulation, confirmed therapeutic by ACT measurement.  The RCA was engaged with a 6F JR4 guide catheter; hemodynamic testing was performed with a Amado Omni wire; iFR = 1.0.  Post iFR angiographic appearance unchanged from diagnostic images.      MEDICATIONS:  See nursing record.     COMPLICATIONS:  No major complications were observed during this visit to the catheterization lab.     IMPRESSION:    1.  Left heart catheterization: LVEDP 12 mmHg, no aortic stenosis.  LVEF 60-65% with normal wall motion, no mitral regurgitation  2.  Coronary angiography:  right dominant system  - LM:  no angiographic disease  - LAD: no significant angiographic disease; mild/mid intra-myocardial systolic compression  - LCx: no significant angiographic disease  - RCA: 40% mid stenosis, iFR = 1.0.    RECOMMENDATIONS: No angiographic or hemodynamic indication for coronary revascularization.
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
## 2025-06-19 NOTE — ED PROVIDER NOTES
Patient Seen in: [facility] Emergency Department        History  Chief Complaint   Patient presents with    Other     Snet here by MD- called and told her \"you need to go to the ER and get admitted - something with my heart.\"     Stated Complaint: Abnormal results    Subjective:   Patient 74-year-old female presents emergency room for evaluation of palpitations and chest pain that occurred for 3 to 4 hours last night.  Patient did not seek medical care at that time.  Patient saw her doctor today who did blood work and found an elevated troponin and elevated D-dimer.  She was sent to the emergency room for further evaluation.  Patient reports no history of cardiac disease she reports this happened about a month ago and she prayed and it went away.  She denies any pain or pressure at this time patient in no distress    The history is provided by the patient and a friend.                     Objective:     Past Medical History:    Anxiety state    Bipolar 1 disorder (HCC)    Cancer (HCC)    skin    Hx of motion sickness    Hyperlipidemia    Hypothyroid    Renal disorder    RESOLVED    Visual impairment    GLASSES/CONTACTS              Past Surgical History:   Procedure Laterality Date    Cataract extraction w/ intraocular lens implant      Colonoscopy N/A 9/28/2023    Procedure: COLONOSCOPY WITH FORCEP POLYPECTOMY;  Surgeon: Gamal Krishnan MD;  Location:  ENDOSCOPY    Skin surgery  03/12/2019    Mohs/L superior eyebrow BCC/ done by MM    [place] teeth removed                  Social History     Socioeconomic History    Marital status: Single   Tobacco Use    Smoking status: Former     Types: Cigarettes    Smokeless tobacco: Never   Vaping Use    Vaping status: Never Used   Substance and Sexual Activity    Alcohol use: No    Drug use: No     Social Drivers of Health     Food Insecurity: No Food Insecurity (6/18/2025)    NCSS - Food Insecurity     Worried About Running Out of Food in the Last Year: No     Ran Out  of Food in the Last Year: No   Transportation Needs: No Transportation Needs (6/18/2025)    NCSS - Transportation     Lack of Transportation: No   Housing Stability: Not At Risk (6/18/2025)    NCSS - Housing/Utilities     Has Housing: Yes     Worried About Losing Housing: No     Unable to Get Utilities: No                                Physical Exam    ED Triage Vitals [06/18/25 2041]   /78   Pulse 64   Resp 18   Temp 97.9 °F (36.6 °C)   Temp src Oral   SpO2 95 %   O2 Device None (Room air)       Current Vitals:   Vital Signs  BP: 113/58  Pulse: 62  Resp: 10  Temp: 97.9 °F (36.6 °C)  Temp src: Oral  MAP (mmHg): 76    Oxygen Therapy  SpO2: 96 %  O2 Device: None (Room air)            Physical Exam  Vitals and nursing note reviewed.   Constitutional:       General: She is not in acute distress.     Appearance: Normal appearance. She is normal weight. She is not ill-appearing, toxic-appearing or diaphoretic.   HENT:      Head: Normocephalic and atraumatic.      Nose: Nose normal.      Mouth/Throat:      Mouth: Mucous membranes are moist.   Eyes:      Extraocular Movements: Extraocular movements intact.      Pupils: Pupils are equal, round, and reactive to light.   Cardiovascular:      Rate and Rhythm: Normal rate and regular rhythm.      Pulses: Normal pulses.      Heart sounds: Normal heart sounds.   Pulmonary:      Effort: Pulmonary effort is normal. No respiratory distress.      Breath sounds: Normal breath sounds. No wheezing.   Abdominal:      General: Bowel sounds are normal. There is no distension.      Palpations: Abdomen is soft.      Tenderness: There is no abdominal tenderness.   Musculoskeletal:         General: Normal range of motion.   Skin:     General: Skin is warm.      Capillary Refill: Capillary refill takes less than 2 seconds.   Neurological:      General: No focal deficit present.      Mental Status: She is alert and oriented to person, place, and time.   Psychiatric:         Mood and  Affect: Mood normal.         Behavior: Behavior normal.                 ED Course  Labs Reviewed   COMP METABOLIC PANEL (14) - Abnormal; Notable for the following components:       Result Value    BUN 24 (*)     Creatinine 1.30 (*)     Calculated Osmolality 296 (*)     eGFR-Cr 43 (*)     All other components within normal limits   TROPONIN I HIGH SENSITIVITY - Abnormal; Notable for the following components:    Troponin I (High Sensitivity) 292 (*)     All other components within normal limits   LIPID PANEL - Abnormal; Notable for the following components:    Cholesterol, Total 219 (*)     HDL Cholesterol 72 (*)     LDL Cholesterol 127 (*)     Non HDL Chol 147 (*)     All other components within normal limits   CBC WITH DIFFERENTIAL WITH PLATELET   PTT, ACTIVATED   RAINBOW DRAW LAVENDER   RAINBOW DRAW LIGHT GREEN   RAINBOW DRAW BLUE     EKG    Rate, intervals and axes as noted on EKG Report.  Rate: 57  Rhythm: Sinus bradycardia   Reading: Sinus bradycardia no ST elevation VT interval of 186 QRS of 82 QTc of 424 with axes of 56/14/34              CTA CHEST (CPT=71275)  Result Date: 6/18/2025  PROCEDURE:  CT ANGIOGRAPHY, CHEST (CPT=71275)  COMPARISON:  None.  INDICATIONS:  Abnormal results  TECHNIQUE:  IV contrast-enhanced multislice CT angiography is performed through the pulmonary arterial anatomy. 3D volume renderings are generated.  Dose reduction techniques were used. Dose information is transmitted to the ACR (American College of Radiology) NRDR (National Radiology Data Registry) which includes the Dose Index Registry.  PATIENT STATED HISTORY:(As transcribed by Technologist)  Patient with palpitations beginning last night with chest pressure and shortness of breath.   CONTRAST USED:  100cc of Isovue 370  FINDINGS:  VASCULATURE:  No visible pulmonary arterial thrombus or attenuation.  THORACIC AORTA:  No aneurysm or visible dissection.  LUNGS:  Mild bibasilar atelectatic changes present.  No suspicious pulmonary  nodules are identified. MEDIASTINUM:  No adenopathy or mass.  GABY:  No mass or adenopathy.  CARDIAC:  No enlargement, pericardial thickening, or significant coronary artery calcification. PLEURA:  No mass or effusion.  CHEST WALL:  No mass or axillary adenopathy.  LIMITED ABDOMEN:  Limited images of the upper abdomen are unremarkable.  BONES:  No bony lesion or fracture.  OTHER:  Negative.             CONCLUSION:  1. No acute abnormality in the chest. 2. No evidence of pulmonary embolism.    LOCATION:  [facility]   Dictated by (CST): Onesimo Hill MD on 2025 at 10:07 PM     Finalized by (CST): Onesimo Hill MD on 2025 at 10:14 PM                         MDM     Social -former quit 35 years ago tobacco, negative etoh, negative drugs  Family History-mother with history of congestive heart disease  at age 68, father with history of strokes  Past Medical History-bipolar disorder, hyperlipidemia, anxiety, hypothyroidism, skin cancer    Differential diagnosis before testing included acute coronary syndrome, pulmonary embolism, palpitations, electrolyte abnormality    Co-morbidities that add to the complexity of management include: Patient had gone to primary doctor and had outpatient blood work showed elevated troponin and D-dimer at his outpatient    Testing ordered during this visit included CTA, repeat labs repeat troponin    Radiographic images  I personally reviewed the radiographs and my individual interpretation shows no PE  I also reviewed the official reports that showed CTA CHEST (CPT=71275)  Result Date: 2025  PROCEDURE:  CT ANGIOGRAPHY, CHEST (CPT=71275)  COMPARISON:  None.  INDICATIONS:  Abnormal results  TECHNIQUE:  IV contrast-enhanced multislice CT angiography is performed through the pulmonary arterial anatomy. 3D volume renderings are generated.  Dose reduction techniques were used. Dose information is transmitted to the ACR (American College of Radiology) NRDR (National Radiology Data  Registry) which includes the Dose Index Registry.  PATIENT STATED HISTORY:(As transcribed by Technologist)  Patient with palpitations beginning last night with chest pressure and shortness of breath.   CONTRAST USED:  100cc of Isovue 370  FINDINGS:  VASCULATURE:  No visible pulmonary arterial thrombus or attenuation.  THORACIC AORTA:  No aneurysm or visible dissection.  LUNGS:  Mild bibasilar atelectatic changes present.  No suspicious pulmonary nodules are identified. MEDIASTINUM:  No adenopathy or mass.  GABY:  No mass or adenopathy.  CARDIAC:  No enlargement, pericardial thickening, or significant coronary artery calcification. PLEURA:  No mass or effusion.  CHEST WALL:  No mass or axillary adenopathy.  LIMITED ABDOMEN:  Limited images of the upper abdomen are unremarkable.  BONES:  No bony lesion or fracture.  OTHER:  Negative.             CONCLUSION:  1. No acute abnormality in the chest. 2. No evidence of pulmonary embolism.    LOCATION:  [facility]   Dictated by (CST): Onesimo Hill MD on 6/18/2025 at 10:07 PM     Finalized by (CST): Onesimo Hill MD on 6/18/2025 at 10:14 PM           External chart review showed review of Care Everywhere in [facility] system shows patient had positive troponin at 114 as outpatient and positive D-dimer    History obtained by an independent source included from patient, family    Discussion of management with patient, family, hospitalist, cardiology    Social determinants of health that affect care include not applicable      Medications Provided: Aspirin    Course of Events during Emergency Room Visit include 74-year-old female presents emergency room for elevated troponin as outpatient labs along with elevated D-dimer will repeat her troponin which is again further elevated.  Patient denies any pain currently her EKG is unremarkable.  Will get CTA to rule out pulmonary embolism and discussed patient with cardiology and hospitalist.  Plan for admission      Patient discussed with  cardiology patient to be started on heparin.  Awaiting callback from [facility] hospitalist    Disposition:    Admission  I have discussed with the patient the results of test, differential diagnosis, and treatment plan. They expressed clear understanding of these instructions and agrees to the plan provided.     Admission disposition: 6/18/2025 10:51 PM           Medical Decision Making      Disposition and Plan     Clinical Impression:  1. Acute chest pain    2. NSTEMI (non-ST elevated myocardial infarction) (HCC)         Disposition:  Admit  6/18/2025 10:51 pm    Follow-up:  No follow-up provider specified.        Medications Prescribed:  Current Discharge Medication List                Supplementary Documentation:         Hospital Problems       Present on Admission  Date Reviewed: 9/26/2023          ICD-10-CM Noted POA    Cancer (HCC) C80.1 Unknown Unknown    Chest pain R07.9 6/18/2025 Unknown

## 2025-06-20 VITALS
DIASTOLIC BLOOD PRESSURE: 59 MMHG | RESPIRATION RATE: 18 BRPM | OXYGEN SATURATION: 92 % | HEART RATE: 74 BPM | SYSTOLIC BLOOD PRESSURE: 90 MMHG | BODY MASS INDEX: 26.9 KG/M2 | HEIGHT: 63 IN | TEMPERATURE: 98 F | WEIGHT: 151.81 LBS

## 2025-06-20 LAB
ALBUMIN SERPL-MCNC: 3.9 G/DL (ref 3.2–4.8)
ANION GAP SERPL CALC-SCNC: 8 MMOL/L (ref 0–18)
ATRIAL RATE: 57 BPM
BUN BLD-MCNC: 18 MG/DL (ref 9–23)
CALCIUM BLD-MCNC: 9.3 MG/DL (ref 8.7–10.6)
CHLORIDE SERPL-SCNC: 105 MMOL/L (ref 98–112)
CO2 SERPL-SCNC: 26 MMOL/L (ref 21–32)
CREAT BLD-MCNC: 1.17 MG/DL (ref 0.55–1.02)
EGFRCR SERPLBLD CKD-EPI 2021: 49 ML/MIN/1.73M2 (ref 60–?)
ERYTHROCYTE [DISTWIDTH] IN BLOOD BY AUTOMATED COUNT: 13.4 %
GLUCOSE BLD-MCNC: 91 MG/DL (ref 70–99)
HCT VFR BLD AUTO: 38.5 % (ref 35–48)
HGB BLD-MCNC: 13.2 G/DL (ref 12–16)
MAGNESIUM SERPL-MCNC: 2.3 MG/DL (ref 1.6–2.6)
MCH RBC QN AUTO: 31.6 PG (ref 26–34)
MCHC RBC AUTO-ENTMCNC: 34.3 G/DL (ref 31–37)
MCV RBC AUTO: 92.1 FL (ref 80–100)
OSMOLALITY SERPL CALC.SUM OF ELEC: 289 MOSM/KG (ref 275–295)
P AXIS: 56 DEGREES
P-R INTERVAL: 186 MS
PHOSPHATE SERPL-MCNC: 3.6 MG/DL (ref 2.4–5.1)
PLATELET # BLD AUTO: 239 10(3)UL (ref 150–450)
POTASSIUM SERPL-SCNC: 4.2 MMOL/L (ref 3.5–5.1)
POTASSIUM SERPL-SCNC: 4.2 MMOL/L (ref 3.5–5.1)
Q-T INTERVAL: 436 MS
QRS DURATION: 82 MS
QTC CALCULATION (BEZET): 424 MS
R AXIS: 14 DEGREES
RBC # BLD AUTO: 4.18 X10(6)UL (ref 3.8–5.3)
SODIUM SERPL-SCNC: 139 MMOL/L (ref 136–145)
T AXIS: 34 DEGREES
VENTRICULAR RATE: 57 BPM
WBC # BLD AUTO: 5.5 X10(3) UL (ref 4–11)

## 2025-06-20 PROCEDURE — 85027 COMPLETE CBC AUTOMATED: CPT

## 2025-06-20 PROCEDURE — 84132 ASSAY OF SERUM POTASSIUM: CPT

## 2025-06-20 PROCEDURE — 80069 RENAL FUNCTION PANEL: CPT

## 2025-06-20 PROCEDURE — 83735 ASSAY OF MAGNESIUM: CPT

## 2025-06-20 RX ORDER — AMLODIPINE BESYLATE 2.5 MG/1
2.5 TABLET ORAL DAILY
Qty: 90 TABLET | Refills: 3 | Status: SHIPPED | OUTPATIENT
Start: 2025-06-20 | End: 2026-06-15

## 2025-06-20 RX ORDER — ATORVASTATIN CALCIUM 40 MG/1
40 TABLET, FILM COATED ORAL NIGHTLY
Qty: 90 TABLET | Refills: 3 | Status: SHIPPED | OUTPATIENT
Start: 2025-06-20 | End: 2025-06-20

## 2025-06-20 RX ORDER — ATORVASTATIN CALCIUM 40 MG/1
40 TABLET, FILM COATED ORAL NIGHTLY
Qty: 90 TABLET | Refills: 3 | Status: SHIPPED | OUTPATIENT
Start: 2025-06-20 | End: 2026-06-15

## 2025-06-20 RX ORDER — ASPIRIN 81 MG/1
81 TABLET, CHEWABLE ORAL DAILY
Qty: 90 TABLET | Refills: 3 | Status: SHIPPED | OUTPATIENT
Start: 2025-06-20 | End: 2026-06-15

## 2025-06-20 RX ORDER — AMLODIPINE BESYLATE 2.5 MG/1
2.5 TABLET ORAL DAILY
Qty: 90 TABLET | Refills: 3 | Status: SHIPPED | OUTPATIENT
Start: 2025-06-20 | End: 2025-06-20

## 2025-06-20 RX ORDER — ASPIRIN 81 MG/1
81 TABLET, CHEWABLE ORAL DAILY
Qty: 90 TABLET | Refills: 3 | Status: SHIPPED | OUTPATIENT
Start: 2025-06-20 | End: 2025-06-20

## 2025-06-20 NOTE — DISCHARGE SUMMARY
Hospitalist Discharge Summary    Admission Date/Time  6/18/2025  8:58 PM  Discharge Date  06/20/25    PCP  Linette Gentile MD     Discharging Hospitalist:  Dante Gonzalez DO    Disposition:  Home    Follow Up Appointments  Linette Gentile MD  [address]  [address]  [facility] [address] [phone]    Schedule an appointment as soon as possible for a visit in 1 week(s)      José Miguel Abad MD  [address]  [address]  [facility] [address] [phone]    Schedule an appointment as soon as possible for a visit in 2 week(s)        Primary Hospital Problems/Hospital Course Summary  74 year old female with PMH of anxiety, bipolar disorder, hyperlipidemia, hypothyroidism who is presenting to the hospital due to palpitations and chest pain.  Notes that she had the symptoms several months ago as well and they went away.  Came into the hospital for further evaluation.  Patient had initial troponin of 292, repeat troponin to 13.  CTA chest negative for PE or acute abnormalities.  Started on heparin GGT, cardiology consulted.  Underwent cardiac cath on 6/19 which showed mild nonobstructive CAD of RCA, TTE done showed no wall motion abnormalities and showed LVEF of 65-70%.  Cardiology was considering possible coronary vasospasm.  Empirically started on amlodipine 2.5 mg daily in addition to atorvastatin 40 mg and aspirin 81 mg daily.  LDL elevated at 127, cholesterol 219.  A1c 5.1 in April.  Patient did not have any more episodes of chest pain during hospitalization.  Cleared for discharge from cardiology standpoint and will follow-up with PCP and cardiology upon discharge.    Medication Changes  Amlodipine 2.5 mg daily  Aspirin 81 mg  Atorvastatin 40 mg    Important Follow Up Items  Cardiology and PCP follow-up    Procedures/Diagnostics  Cardiac cath, echo    Change in Code Status: N/A    Discharge Medications       Medication List        START taking these medications      amLODIPine 2.5 MG  Tabs  Commonly known as: Norvasc  Take 1 tablet (2.5 mg total) by mouth daily.     aspirin 81 MG Chew  Chew 1 tablet (81 mg total) by mouth daily.     atorvastatin 40 MG Tabs  Commonly known as: Lipitor  Take 1 tablet (40 mg total) by mouth nightly.            CONTINUE taking these medications      CALCIUM CITRATE-VITAMIN D OR     GNP Hair/Skin/Nails Tabs     levothyroxine 75 MCG Tabs  Commonly known as: Synthroid     QUEtiapine 25 MG Tabs  Commonly known as: SEROquel  Take 3 tablets (75 mg total) by mouth nightly.     Vitamin D 50 MCG (2000) Tabs            STOP taking these medications      mupirocin 2 % Oint  Commonly known as: Bactroban               Where to Get Your Medications        You can get these medications from any pharmacy    Bring a paper prescription for each of these medications  amLODIPine 2.5 MG Tabs  aspirin 81 MG Chew  atorvastatin 40 MG Tabs       I reconciled current and discharge medications on the day of discharge.    Imaging/Diagnostic Reports  CARD ECHO 2D DOPPLER (CPT=93306)  Result Date: 2025  Transthoracic Echocardiogram Name:Trina Victoria Date: 2025 :  1950 Ht:  (63in)  BP: 99 / 57 MRN:  [id number]     Age:  74years    Wt:  (151lb) HR: 52bpm Loc:  [facility]       Gndr: F          BSA: 1.72m^2 Sonographer: Dimitri ARGUETA [facility] RVS RVT Ordering:    Dante Gonzalez Consulting:  José Miguel Jones ---------------------------------------------------------------------------- History/Indications:   Chest pain. ---------------------------------------------------------------------------- Procedure information:  A transthoracic complete 2D study was performed. Additional evaluation included M-mode, complete spectral Doppler, and color Doppler.  Patient status:  Inpatient.  Location:  [facility].    This was a STAT study. Transthoracic echocardiography for ventricular function evaluation and assessment of valvular function. Image quality was adequate. ECG rhythm:   Sinus bradycardia  ---------------------------------------------------------------------------- Conclusions: 1. Left ventricle: The cavity size was normal. Wall thickness was normal.    Systolic function was normal. The estimated ejection fraction was 65-70%,    by visual assessment. No diagnostic evidence for regional wall motion    abnormalities. Left ventricular diastolic function is indeterminate. 2. Aortic valve: There was mild regurgitation. Impressions:  No previous study was available for comparison. * ---------------------------------------------------------------------------- * Findings: Left ventricle:  The cavity size was normal. Wall thickness was normal. Systolic function was normal. The estimated ejection fraction was 65-70%, by visual assessment. No diagnostic evidence for regional wall motion abnormalities. Left ventricular diastolic function is indeterminate. Left atrium:  The atrium was normal in size. The left atrial volume was normal. Right ventricle:  The cavity size was normal. Systolic function was normal. Right atrium:  The atrium was normal in size. Mitral valve:  The leaflets were mildly thickened and mildly calcified. Leaflet separation was normal.  Doppler:  Transvalvular velocity was within the normal range. There was no evidence for stenosis. There was trivial regurgitation. Aortic valve:   The valve was trileaflet. The leaflets were mildly thickened and mildly calcified. Cusp separation was normal.  Doppler:  Transvalvular velocity was within the normal range. There was no evidence for stenosis. There was mild regurgitation. Tricuspid valve:  The valve is structurally normal. Leaflet separation was normal.  Doppler:  Transvalvular velocity was within the normal range. There was no evidence for stenosis. There was no significant regurgitation. Pulmonic valve:   Not well visualized.  Doppler:  Transvalvular velocity was within the normal range. There was no evidence for stenosis. There was no significant  regurgitation. Pericardium:   There was no pericardial effusion. Aorta: Aortic root: The aortic root was normal. Ascending aorta: The ascending aorta was normal. Pulmonary arteries: Systolic pressure could not be accurately estimated. Systemic veins:  Central venous respirophasic diameter changes are in the normal range (>50%). Inferior vena cava: The IVC was normal-sized. ---------------------------------------------------------------------------- Measurements  Left ventricle                  Value        Ref  IVS thickness, ED, PLAX         0.8   cm     0.6 - 0.9  LV ID, ED, PLAX                 4.3   cm     3.8 - 5.2  LV ID, ES, PLAX                 2.7   cm     2.2 - 3.5  LV PW thickness, ED, PLAX       0.8   cm     0.6 - 0.9  IVS/LV PW ratio, ED, PLAX       1.03         ---------  LV PW/LV ID ratio, ED, PLAX     0.19         ---------  LV ejection fraction            68    %      54 - 74  LV e', lateral              (L) 5.9   cm/sec >=10.0  LV E/e', lateral            (H) 14           <=13  LV e', medial               (L) 6.1   cm/sec >=7.0  LV E/e', medial                 14           ---------  LV e', average                  6.0   cm/sec ---------  LV E/e', average                14           <=14  Aortic root                     Value        Ref  Aortic root ID, ED              3.0   cm     2.5 - 3.9  Ascending aorta                 Value        Ref  Ascending aorta ID, A-P, ED     3.0   cm     1.9 - 3.5  Left atrium                     Value        Ref  LA ID, A-P, ES                  2.8   cm     2.7 - 3.8  LA volume, S                    25    ml     22 - 52  LA volume/bsa, S            (L) 14    ml/m^2 16 - 34  LA volume, ES, 1-p A2C          25    ml     22 - 52  LA/aortic root ratio            0.93         ---------  Mitral valve                    Value        Ref  Mitral E-wave peak velocity     0.85  m/sec  ---------  Mitral A-wave peak velocity     1.07  m/sec  ---------  Mitral peak gradient, D          3     mm Hg  ---------  Mitral E/A ratio, peak          0.8          ---------  Systemic veins                  Value        Ref  Estimated CVP                   3     mm Hg  ---------  Right ventricle                 Value        Ref  TAPSE, 2D                       1.70  cm     >=1.70 Legend: (L)  and  (H)  joan values outside specified reference range. ---------------------------------------------------------------------------- Prepared and electronically signed by José Miguel Jones 06/19/2025 16:59     CATH ANGIO  Result Date: 6/19/2025  This exam has been completed. Please refer to Notes for the results to this procedure.    CTA CHEST (CPT=71275)  Result Date: 6/18/2025  PROCEDURE:  CT ANGIOGRAPHY, CHEST (CPT=71275)  COMPARISON:  None.  INDICATIONS:  Abnormal results  TECHNIQUE:  IV contrast-enhanced multislice CT angiography is performed through the pulmonary arterial anatomy. 3D volume renderings are generated.  Dose reduction techniques were used. Dose information is transmitted to the ACR (American College of Radiology) NRDR (National Radiology Data Registry) which includes the Dose Index Registry.  PATIENT STATED HISTORY:(As transcribed by Technologist)  Patient with palpitations beginning last night with chest pressure and shortness of breath.   CONTRAST USED:  100cc of Isovue 370  FINDINGS:  VASCULATURE:  No visible pulmonary arterial thrombus or attenuation.  THORACIC AORTA:  No aneurysm or visible dissection.  LUNGS:  Mild bibasilar atelectatic changes present.  No suspicious pulmonary nodules are identified. MEDIASTINUM:  No adenopathy or mass.  GABY:  No mass or adenopathy.  CARDIAC:  No enlargement, pericardial thickening, or significant coronary artery calcification. PLEURA:  No mass or effusion.  CHEST WALL:  No mass or axillary adenopathy.  LIMITED ABDOMEN:  Limited images of the upper abdomen are unremarkable.  BONES:  No bony lesion or fracture.  OTHER:  Negative.             CONCLUSION:  1. No  acute abnormality in the chest. 2. No evidence of pulmonary embolism.    LOCATION:  [facility]   Dictated by (CST): Onesimo Hill MD on 6/18/2025 at 10:07 PM     Finalized by (CST): Onesimo Hill MD on 6/18/2025 at 10:14 PM         Secondary Discharge Diagnoses  Hypothyroidism, bipolar disorder, anxiety    Pertinent Physical Exam At Time of Discharge  Vitals:    06/19/25 2337 06/20/25 0449 06/20/25 0700 06/20/25 0920   BP: 102/59 102/60 102/65 90/59   BP Location: Left arm Left arm     Pulse: 73 62 72 74   Resp: 20 18     Temp: 97.7 °F (36.5 °C) 97.3 °F (36.3 °C) 97.8 °F (36.6 °C)    TempSrc: Oral Oral     SpO2:   92%    Weight:       Height:            General: no acute distress  Heart: RRR  Lungs: CTAB  Abd: Soft, NT, ND  Neuro: no focal deficits    Total time coordinating care 37 mins.    Patient had opportunity to ask questions and stated understanding and agreed with therapeutic plan as outlined.     Dante Gonzalez DO  6/20/2025

## 2025-06-20 NOTE — PROGRESS NOTES
[facility] Cardiology  Progress Note    Trina Victoria Patient Status:  Inpatient    1950 MRN [id number]   [facility] 2NE-A Attending Dante Gonzalez,    Hosp Day # 2 PCP Linette Gentile MD       Impression:  1. chest pain/heart pounding--> mild NSTEMI, HS trop 200s; ECG sinus bradycardia without ST changes--> LHC: mild non-obs CAD of the RCA.  iFR = 1.0.  TTE LVEF 65-70%.  Consider coronary vasospasm.     2. mild HL, remote smoker     Recommendations:  - amlodipine 2.5mg daily for empiric vasospasm therapy  - asa, atorvastatin 40mg  - dc today, f/u 2 weeks in office.    Subjective:  The patient denies any chest pain or dyspnea at this time. Reviewed cath results again this AM.    Objective:  /60 (BP Location: Left arm)   Pulse 62   Temp 97.8 °F (36.6 °C)   Resp 18   Ht 5' 3\" (1.6 m)   Wt 151 lb 12.8 oz (68.9 kg)   SpO2 92%   BMI 26.89 kg/m²   Temp (24hrs), Av.6 °F (36.4 °C), Min:97.2 °F (36.2 °C), Max:97.8 °F (36.6 °C)      Intake/Output Summary (Last 24 hours) at 2025 0733  Last data filed at 2025 0559  Gross per 24 hour   Intake 309.74 ml   Output --   Net 309.74 ml     Wt Readings from Last 3 Encounters:   25 151 lb 12.8 oz (68.9 kg)   23 140 lb (63.5 kg)   22 160 lb (72.6 kg)       General: Awake and alert; in no acute distress  Cardiac: Regular rate and regular rhythm; no murmurs/rubs/gallops are appreciated  Lungs: Clear to auscultation bilaterally; no accessory muscle use  Abdomen: Soft, non-tender; bowel sounds are normoactive  Extremities: No clubbing/cyanosis; moves all 4 extremities normally    Current Hospital Medications[1]    Laboratory Data:  Lab Results   Component Value Date    WBC 5.5 2025    HGB 13.2 2025    HCT 38.5 2025    .0 2025     No results found for: \"INR\"  Lab Results   Component Value Date     2025    K 4.2 2025    K 4.2 2025     2025    CO2  26.0 06/20/2025    BUN 18 06/20/2025    CREATSERUM 1.17 06/20/2025    GLU 91 06/20/2025    CA 9.3 06/20/2025    MG 2.3 06/20/2025       Telemetry: No malignant tachyarrhythmias or bradyarrhythmias      Thank you for allowing our practice to participate in the care of your patient. Please do not hesitate to contact me if you have any questions.             [1]   Current Facility-Administered Medications   Medication Dose Route Frequency    polyethylene glycol (PEG 3350) (Miralax) 17 g oral packet 17 g  17 g Oral Daily PRN    amLODIPine (Norvasc) tab 2.5 mg  2.5 mg Oral Daily    levothyroxine (Synthroid) tab 75 mcg  75 mcg Oral Before breakfast    QUEtiapine (SEROquel) tab 75 mg  75 mg Oral Nightly    atorvastatin (Lipitor) tab 40 mg  40 mg Oral Nightly    aspirin chewable tab 81 mg  81 mg Oral Daily

## 2025-06-20 NOTE — PLAN OF CARE
Patient alert and oriented x 4. Up  SBA. On RA. NSR on tele. Continent of bowel and bladder. No complaints of pain, shortness of breath or chest pain/discomfort. R wrist incision site w/ dressing C/DI, soft and no hematoma. POC : Discharge planning. Fall precautions in place. Call light within reach.    Problem: Patient/Family Goals  Goal: Patient/Family Long Term Goal  Description: Patient's Long Term Goal: Stay out of hospital    Interventions:  - Follow up with PCP after discharge  - Take medications as prescribed  - See additional Care Plan goals for specific interventions  Outcome: Progressing  Goal: Patient/Family Short Term Goal  Description: Patient's Short Term Goal: Go home    Interventions:   - Test ordered  - Monitor las  - Monitor on tele  - See additional Care Plan goals for specific interventions  Outcome: Progressing     Problem: CARDIOVASCULAR - ADULT  Goal: Maintains optimal cardiac output and hemodynamic stability  Description: INTERVENTIONS:  - Monitor vital signs, rhythm, and trends  - Monitor for bleeding, hypotension and signs of decreased cardiac output  - Evaluate effectiveness of vasoactive medications to optimize hemodynamic stability  - Monitor arterial and/or venous puncture sites for bleeding and/or hematoma  - Assess quality of pulses, skin color and temperature  - Assess for signs of decreased coronary artery perfusion - ex. Angina  - Evaluate fluid balance, assess for edema, trend weights  Outcome: Progressing  Goal: Absence of cardiac arrhythmias or at baseline  Description: INTERVENTIONS:  - Continuous cardiac monitoring, monitor vital signs, obtain 12 lead EKG if indicated  - Evaluate effectiveness of antiarrhythmic and heart rate control medications as ordered  - Initiate emergency measures for life threatening arrhythmias  - Monitor electrolytes and administer replacement therapy as ordered  Outcome: Progressing

## 2025-06-24 NOTE — PAYOR COMM NOTE
--------------  DISCHARGE REVIEW    Payor: TAI WHITNEY O  Subscriber #:  [id number]  Authorization Number: [id number]    Admit date: 6/18/25  Admit time:  11:56 PM  Discharge Date: 6/20/2025 11:33 AM       Hospitalist Discharge Summary    Admission Date/Time  6/18/2025  8:58 PM  Discharge Date  06/20/25    PCP  Linette Gentile MD     Discharging Hospitalist:  Dante Gonzalez DO    Disposition:  Home      Primary Hospital Problems/Hospital Course Summary  74 year old female with PMH of anxiety, bipolar disorder, hyperlipidemia, hypothyroidism who is presenting to the hospital due to palpitations and chest pain.  Notes that she had the symptoms several months ago as well and they went away.  Came into the hospital for further evaluation.  Patient had initial troponin of 292, repeat troponin to 13.  CTA chest negative for PE or acute abnormalities.  Started on heparin GGT, cardiology consulted.  Underwent cardiac cath on 6/19 which showed mild nonobstructive CAD of RCA, TTE done showed no wall motion abnormalities and showed LVEF of 65-70%.  Cardiology was considering possible coronary vasospasm.  Empirically started on amlodipine 2.5 mg daily in addition to atorvastatin 40 mg and aspirin 81 mg daily.  LDL elevated at 127, cholesterol 219.  A1c 5.1 in April.  Patient did not have any more episodes of chest pain during hospitalization.  Cleared for discharge from cardiology standpoint and will follow-up with PCP and cardiology upon discharge.

## 2025-06-30 ENCOUNTER — HOSPITAL ENCOUNTER (OUTPATIENT)
Facility: HOSPITAL | Age: 75
Setting detail: OBSERVATION
Discharge: HOME | End: 2025-07-01
Attending: EMERGENCY MEDICINE | Admitting: INTERNAL MEDICINE
Payer: MEDICARE

## 2025-06-30 ENCOUNTER — APPOINTMENT (OUTPATIENT)
Dept: GENERAL RADIOLOGY | Facility: HOSPITAL | Age: 75
End: 2025-06-30
Attending: EMERGENCY MEDICINE
Payer: MEDICARE

## 2025-06-30 DIAGNOSIS — I21.4 NSTEMI (NON-ST ELEVATED MYOCARDIAL INFARCTION) (HCC): Primary | ICD-10-CM

## 2025-06-30 PROBLEM — R79.89 AZOTEMIA: Status: ACTIVE | Noted: 2025-06-30

## 2025-06-30 PROBLEM — I73.9 VASOSPASM: Status: ACTIVE | Noted: 2025-06-30

## 2025-06-30 LAB
ALBUMIN SERPL-MCNC: 4.3 G/DL (ref 3.2–4.8)
ALBUMIN/GLOB SERPL: 1.7 {RATIO} (ref 1–2)
ALP LIVER SERPL-CCNC: 88 U/L (ref 55–142)
ALT SERPL-CCNC: 23 U/L (ref 10–49)
ANION GAP SERPL CALC-SCNC: 9 MMOL/L (ref 0–18)
APTT PPP: 25.8 SECONDS (ref 23–36)
APTT PPP: 92.7 SECONDS (ref 23–36)
AST SERPL-CCNC: 23 U/L (ref ?–34)
BASOPHILS # BLD AUTO: 0.08 X10(3) UL (ref 0–0.2)
BASOPHILS NFR BLD AUTO: 1.3 %
BILIRUB SERPL-MCNC: 0.5 MG/DL (ref 0.2–1.1)
BUN BLD-MCNC: 26 MG/DL (ref 9–23)
CALCIUM BLD-MCNC: 9.8 MG/DL (ref 8.7–10.6)
CHLORIDE SERPL-SCNC: 106 MMOL/L (ref 98–112)
CHOLEST SERPL-MCNC: 172 MG/DL (ref ?–200)
CO2 SERPL-SCNC: 29 MMOL/L (ref 21–32)
CREAT BLD-MCNC: 1.24 MG/DL (ref 0.55–1.02)
EGFRCR SERPLBLD CKD-EPI 2021: 46 ML/MIN/1.73M2 (ref 60–?)
EOSINOPHIL # BLD AUTO: 0.18 X10(3) UL (ref 0–0.7)
EOSINOPHIL NFR BLD AUTO: 3 %
ERYTHROCYTE [DISTWIDTH] IN BLOOD BY AUTOMATED COUNT: 13.1 %
GLOBULIN PLAS-MCNC: 2.6 G/DL (ref 2–3.5)
GLUCOSE BLD-MCNC: 77 MG/DL (ref 70–99)
HCT VFR BLD AUTO: 40.1 % (ref 35–48)
HDLC SERPL-MCNC: 66 MG/DL (ref 40–59)
HGB BLD-MCNC: 13.7 G/DL (ref 12–16)
IMM GRANULOCYTES # BLD AUTO: 0.01 X10(3) UL (ref 0–1)
IMM GRANULOCYTES NFR BLD: 0.2 %
INR BLD: 0.97 (ref 0.8–1.2)
LDLC SERPL CALC-MCNC: 92 MG/DL (ref ?–100)
LYMPHOCYTES # BLD AUTO: 1.81 X10(3) UL (ref 1–4)
LYMPHOCYTES NFR BLD AUTO: 30.1 %
MCH RBC QN AUTO: 32.1 PG (ref 26–34)
MCHC RBC AUTO-ENTMCNC: 34.2 G/DL (ref 31–37)
MCV RBC AUTO: 93.9 FL (ref 80–100)
MONOCYTES # BLD AUTO: 0.67 X10(3) UL (ref 0.1–1)
MONOCYTES NFR BLD AUTO: 11.1 %
NEUTROPHILS # BLD AUTO: 3.26 X10 (3) UL (ref 1.5–7.7)
NEUTROPHILS # BLD AUTO: 3.26 X10(3) UL (ref 1.5–7.7)
NEUTROPHILS NFR BLD AUTO: 54.3 %
NONHDLC SERPL-MCNC: 106 MG/DL (ref ?–130)
OSMOLALITY SERPL CALC.SUM OF ELEC: 302 MOSM/KG (ref 275–295)
PLATELET # BLD AUTO: 260 10(3)UL (ref 150–450)
POTASSIUM SERPL-SCNC: 4.1 MMOL/L (ref 3.5–5.1)
PROT SERPL-MCNC: 6.9 G/DL (ref 5.7–8.2)
PROTHROMBIN TIME: 13 SECONDS (ref 11.6–14.8)
RBC # BLD AUTO: 4.27 X10(6)UL (ref 3.8–5.3)
SODIUM SERPL-SCNC: 144 MMOL/L (ref 136–145)
TRIGL SERPL-MCNC: 77 MG/DL (ref 30–149)
TROPONIN I SERPL HS-MCNC: 318 NG/L (ref ?–34)
TROPONIN I SERPL HS-MCNC: 367 NG/L (ref ?–34)
TROPONIN I SERPL HS-MCNC: 368 NG/L (ref ?–34)
VLDLC SERPL CALC-MCNC: 12 MG/DL (ref 0–30)
WBC # BLD AUTO: 6 X10(3) UL (ref 4–11)

## 2025-06-30 PROCEDURE — 93010 ELECTROCARDIOGRAM REPORT: CPT

## 2025-06-30 PROCEDURE — 96365 THER/PROPH/DIAG IV INF INIT: CPT

## 2025-06-30 PROCEDURE — 84484 ASSAY OF TROPONIN QUANT: CPT | Performed by: INTERNAL MEDICINE

## 2025-06-30 PROCEDURE — 80053 COMPREHEN METABOLIC PANEL: CPT | Performed by: EMERGENCY MEDICINE

## 2025-06-30 PROCEDURE — 99285 EMERGENCY DEPT VISIT HI MDM: CPT

## 2025-06-30 PROCEDURE — 93005 ELECTROCARDIOGRAM TRACING: CPT

## 2025-06-30 PROCEDURE — 85025 COMPLETE CBC W/AUTO DIFF WBC: CPT | Performed by: EMERGENCY MEDICINE

## 2025-06-30 PROCEDURE — 71045 X-RAY EXAM CHEST 1 VIEW: CPT | Performed by: EMERGENCY MEDICINE

## 2025-06-30 PROCEDURE — 80061 LIPID PANEL: CPT | Performed by: EMERGENCY MEDICINE

## 2025-06-30 PROCEDURE — 85610 PROTHROMBIN TIME: CPT | Performed by: EMERGENCY MEDICINE

## 2025-06-30 PROCEDURE — 84484 ASSAY OF TROPONIN QUANT: CPT | Performed by: EMERGENCY MEDICINE

## 2025-06-30 PROCEDURE — 85730 THROMBOPLASTIN TIME PARTIAL: CPT | Performed by: EMERGENCY MEDICINE

## 2025-06-30 RX ORDER — MULTIVIT-MIN/IRON FUM/FOLIC AC 7.5 MG-4
1 TABLET ORAL DAILY
COMMUNITY

## 2025-06-30 RX ORDER — MORPHINE SULFATE 2 MG/ML
1 INJECTION, SOLUTION INTRAMUSCULAR; INTRAVENOUS EVERY 2 HOUR PRN
Status: DISCONTINUED | OUTPATIENT
Start: 2025-06-30 | End: 2025-07-01

## 2025-06-30 RX ORDER — HEPARIN SODIUM AND DEXTROSE 10000; 5 [USP'U]/100ML; G/100ML
INJECTION INTRAVENOUS CONTINUOUS
Status: DISCONTINUED | OUTPATIENT
Start: 2025-06-30 | End: 2025-07-01

## 2025-06-30 RX ORDER — SODIUM CHLORIDE, SODIUM LACTATE, POTASSIUM CHLORIDE, CALCIUM CHLORIDE 600; 310; 30; 20 MG/100ML; MG/100ML; MG/100ML; MG/100ML
INJECTION, SOLUTION INTRAVENOUS CONTINUOUS
Status: DISCONTINUED | OUTPATIENT
Start: 2025-06-30 | End: 2025-07-01

## 2025-06-30 RX ORDER — MORPHINE SULFATE 4 MG/ML
4 INJECTION, SOLUTION INTRAMUSCULAR; INTRAVENOUS EVERY 2 HOUR PRN
Status: DISCONTINUED | OUTPATIENT
Start: 2025-06-30 | End: 2025-07-01

## 2025-06-30 RX ORDER — QUETIAPINE FUMARATE 25 MG/1
75 TABLET, FILM COATED ORAL NIGHTLY
Status: DISCONTINUED | OUTPATIENT
Start: 2025-06-30 | End: 2025-07-01

## 2025-06-30 RX ORDER — HEPARIN SODIUM AND DEXTROSE 10000; 5 [USP'U]/100ML; G/100ML
12 INJECTION INTRAVENOUS ONCE
Status: COMPLETED | OUTPATIENT
Start: 2025-06-30 | End: 2025-06-30

## 2025-06-30 RX ORDER — HEPARIN SODIUM 1000 [USP'U]/ML
60 INJECTION, SOLUTION INTRAVENOUS; SUBCUTANEOUS ONCE
Status: COMPLETED | OUTPATIENT
Start: 2025-06-30 | End: 2025-06-30

## 2025-06-30 RX ORDER — ASPIRIN 81 MG/1
81 TABLET, CHEWABLE ORAL DAILY
Status: DISCONTINUED | OUTPATIENT
Start: 2025-07-01 | End: 2025-07-01

## 2025-06-30 RX ORDER — HYDROMORPHONE HYDROCHLORIDE 1 MG/ML
0.5 INJECTION, SOLUTION INTRAMUSCULAR; INTRAVENOUS; SUBCUTANEOUS EVERY 30 MIN PRN
Refills: 0 | Status: CANCELLED | OUTPATIENT
Start: 2025-06-30 | End: 2025-06-30

## 2025-06-30 RX ORDER — MORPHINE SULFATE 2 MG/ML
2 INJECTION, SOLUTION INTRAMUSCULAR; INTRAVENOUS EVERY 2 HOUR PRN
Status: DISCONTINUED | OUTPATIENT
Start: 2025-06-30 | End: 2025-07-01

## 2025-06-30 RX ORDER — ATORVASTATIN CALCIUM 40 MG/1
40 TABLET, FILM COATED ORAL NIGHTLY
Status: DISCONTINUED | OUTPATIENT
Start: 2025-07-01 | End: 2025-07-01

## 2025-06-30 RX ORDER — LEVOTHYROXINE SODIUM 75 UG/1
75 TABLET ORAL
Status: DISCONTINUED | OUTPATIENT
Start: 2025-07-01 | End: 2025-07-01

## 2025-06-30 RX ORDER — AMLODIPINE BESYLATE 2.5 MG/1
2.5 TABLET ORAL DAILY
Status: DISCONTINUED | OUTPATIENT
Start: 2025-07-01 | End: 2025-07-01

## 2025-06-30 NOTE — DISCHARGE INSTRUCTIONS
Your Cardiology team will follow up with you on the results of your MRI    Transportation Resources  -[employer] [place] - [phone] [email or web address]  -[employer]ide [place] [phone] or [phone] [email or web address]  -First Transit [phone] [email or web address]    -[place] Ride Around [facility] [phone]  -[facility] Transit System [phone]  -[facility] [employer] Transportation [phone] Ext. [phone]   -[facility] Pace Dial-a-Ride Service  Reservations: [phone]  -[facility] [employer] Shuttle Bus Line at [phone]  -[facility] Senior Bus Service [phone]  -[facility] [phone].  -[facility] Transit 4-666-GYJ-4KAT  -[facility] [phone]   -[employer] Transportation [phone]  -[employer] Transportation [phone] [email or web address]  -[employer] Transport [phone]  -Disabled on the Go [phone]  -[employer] Safety Transfer [phone]  -[facility] Wheelchair Transport [phone]   -[employer]: [phone]  -Connections [facility] mobile [place] [phone]  -[employer] [phone]  -[employer] [facility], [employer], [facility] [place], and [facility]. [phone] [email or web address]  -[employer] Transportation [facility] and surrounding communities [phone] [email or web address]    Search for additional community resources at [email or web address]

## 2025-06-30 NOTE — ED QUICK NOTES
Received bedside report from Molly MOSES. Pt resting in bed. No complaints at this time. Aware to call for any assistance.

## 2025-06-30 NOTE — PLAN OF CARE
NURSING ADMISSION NOTE      Patient admitted via Cart  Oriented to room.  Safety precautions initiated.  Bed in low position.  Call light in reach.      RN called and paged Lab for timed lab draw.     Problem: Patient/Family Goals  Goal: Patient/Family Long Term Goal  Description: Patient's Long Term Goal: DC to home     Interventions:  - Follow plan of care     - See additional Care Plan goals for specific interventions  Outcome: Progressing  Goal: Patient/Family Short Term Goal  Description: Patient's Short Term Goal: feel better     Interventions:   - med. Echo, cMRI    - See additional Care Plan goals for specific interventions  Outcome: Progressing     Problem: CARDIOVASCULAR - ADULT  Goal: Maintains optimal cardiac output and hemodynamic stability  Description: INTERVENTIONS:  - Monitor vital signs, rhythm, and trends  - Monitor for bleeding, hypotension and signs of decreased cardiac output  - Evaluate effectiveness of vasoactive medications to optimize hemodynamic stability  - Monitor arterial and/or venous puncture sites for bleeding and/or hematoma  - Assess quality of pulses, skin color and temperature  - Assess for signs of decreased coronary artery perfusion - ex. Angina  - Evaluate fluid balance, assess for edema, trend weights  Outcome: Progressing  Goal: Absence of cardiac arrhythmias or at baseline  Description: INTERVENTIONS:  - Continuous cardiac monitoring, monitor vital signs, obtain 12 lead EKG if indicated  - Evaluate effectiveness of antiarrhythmic and heart rate control medications as ordered  - Initiate emergency measures for life threatening arrhythmias  - Monitor electrolytes and administer replacement therapy as ordered  Outcome: Progressing

## 2025-06-30 NOTE — ED QUICK NOTES
Orders for admission, patient is aware of plan and ready to go upstairs. Any questions, please call ED RN rachael at extension [phone].     Patient Covid vaccination status: Unvaccinated     COVID Test Ordered in ED: None    COVID Suspicion at Admission: N/A    Running Infusions: Medication Infusions[1]     Mental Status/LOC at time of transport: AxO x4    Other pertinent information: On room air. Continent x2. Up with supervision.   CIWA score: N/A   NIH score:  N/A             [1]    continuous dose heparin

## 2025-06-30 NOTE — PROGRESS NOTES
06/30/25 1259 06/30/25 1306 06/30/25 1311   Vital Signs   /63 109/60 110/74   MAP (mmHg) 77 77 84   BP Location Right arm Right arm Right arm   BP Method Automatic Automatic Automatic   Patient Position Lying Sitting Standing

## 2025-06-30 NOTE — H&P
[facility] Hospitalist History and Physical      PCP: Linette Gentile MD    History of Present Illness: This is the story of Ms. Shilpa Victoria who is a 75 y/o F w/ PMHx of BPD, HLD, HypoT, Non-Obstructive CAD, Palpitations who presents back to the hospital after recent discharge w/ chief complaints of palpitations.    She experiences palpitations without chest pain or pressure, lasting three to four hours, similar to a previous episode two weeks ago. The previous episode included chest pressure, which is absent this time. She feels shortness of breath, described as a need to keep inhaling and exhaling, which she attributes to anxiety. Symptoms do not worsen with physical activity.    Two weeks ago, she underwent heart catheterization revealing cholesterol deposits in the RCA, but no stent needed. She started new medications post-procedure, taken at 6 AM, with subsequent symptom improvement.    She lives alone and denies recent illness. No nausea, vomiting, diarrhea, cough, or significant leg swelling.    ED Vitals: wnl  Labs:   Cr 1.24  Trop 318    CXR: negative    Patient was started on heparin gtt and Cardiology was consulted.    Past Medical History[1]   Past Surgical History[2]     Prior to Admission Medications[3]    Social History     Tobacco Use    Smoking status: Former     Types: Cigarettes    Smokeless tobacco: Never   Substance Use Topics    Alcohol use: No        OBJECTIVE:  /74 (BP Location: Right arm)   Pulse 56   Temp 98.1 °F (36.7 °C) (Oral)   Resp 14   Ht 5' 3\" (1.6 m)   Wt 149 lb 7.6 oz (67.8 kg)   SpO2 98%   BMI 26.48 kg/m²   Gen: No acute distress, alert and oriented x3  Pulm: Lungs clear bilaterally, normal respiratory effort  CV: Heart with regular rate and rhythm  Abd: Abdomen soft, nontender, non-distended  Skin: no rashes or lesions  Extremities: no edema noted  Neuro:  no focal deficits      Data Review:    Pertinent Labs and Imaging independently  reviewed    Assessment/Plan:   Outpatient records or previous hospital records reviewed.   Su Hospitalist to continue to follow patient while in house    A/P: 73 y/o F w/ PMHx of BPD, HLD, HypoT, Non-Obstructive CAD, Palpitations who presents back to the hospital after recent discharge w/ chief complaints of palpitations.    Palpitations  Elevated Troponin, Low suspicion for Type 1  Plan:  - Cards consulted, limited echocardiogram ordered and cardiac MRI.  - Trend troponin levels.  - Adjust amlodipine dosage if blood pressure allows.  - Continue heparin gtt for now until next troponin then re-evaluate  - Will need Ziopatch on discharge    Coronary Artery Disease (CAD)  40% blockage in right coronary artery on [facility] on 6/19, no stenting required.  ECHO 60-65% at that time  Plan:  - Heparin gtt  - Repeat ECHO  - Continue ASA/Statin    HypoT  - Synthroid    BPD  - Seroquel    A total of 38 minutes taken with patient and coordinating care.  Greater than 50% face to face encounter.      The following individual(s) verbally consented to be recorded using ambient AI listening technology and understand that they can each withdraw their consent to this listening technology at any point by asking the clinician to turn off or pause the recording.      Edy Sevilla D.O.  [facility] Hospitalist              [1]   Past Medical History:   Anxiety state    Bipolar 1 disorder (HCC)    Cancer (HCC)    skin    Hx of motion sickness    Hyperlipidemia    Hypothyroid    Renal disorder    RESOLVED    Visual impairment    GLASSES/CONTACTS   [2]   Past Surgical History:  Procedure Laterality Date    Cataract extraction w/ intraocular lens implant      Cath angio  6/19/2025    Colonoscopy N/A 9/28/2023    Procedure: COLONOSCOPY WITH FORCEP POLYPECTOMY;  Surgeon: Gamal Krishnan MD;  Location:  ENDOSCOPY    Skin surgery  03/12/2019    Mohs/L superior eyebrow BCC/ done by MM    [place] teeth removed     [3]   No current outpatient  medications on file.

## 2025-06-30 NOTE — PROGRESS NOTES
Patient is admitted for palpation . Pt is AOX4.  VSS, afebrile and denies any pain. SpO2 maintained on RA. . Denies any SOB, cough. Tele-SB.  Heparin gtt. Cardiac control  diet. Denies any n/v/d. Voids. Up with SBA.  IV fluid is infusing. Echo ordered. cMRI for tomorrow. NPO after midnight. RN called the  MRI department to confirm the  cMRI order. The MRI tech said pt is 1st  on the inpatient list unless there is STAT order. Will continue to monitor. Pt is updated with plan of care.

## 2025-06-30 NOTE — ED INITIAL ASSESSMENT (HPI)
[facility] , states NSTEMI in June. Palpitations/pounding since 0200 with SOB, denies lightheaded, dizzy or CP.

## 2025-06-30 NOTE — CM/SW NOTE
SW noted and acknowledged order for SDOH.    SW spoke with RN regarding SDOH concerns for housing. RN confirmed pt has housing and feels safe at home. RN corrected questions in SDOH flowsheet. RN did indicate some transportation needs for pt.      SW added transportation resources and the [employer] link to pt's AVS.       to remain available for support and/or discharge planning.    Love Dias RAMSEY  Discharge Planner  [phone]

## 2025-06-30 NOTE — CONSULTS
Cardiology Consultation Note      Trina Victoria Patient Status:  Emergency    1950 MRN [id number]   Location [facility] EMERGENCY DEPARTMENT Attending Vikram Garland MD   Hosp Day # 0 PCP Linette Gentile MD     Reason for consultation:  Chest pain    Impression:  Recurrent episodes of chest pain, heart pounding: [facility] on 25  LM-ok, LAD-ok, LCx-ok, RCA- 40%, iFR = 1.0. LVEF 60-65%.   Diagnosed with coronary vasospasm from the above cath and started on amlodipine 2.5 mg   HLD  Remote smoker     Plan:  Increase anlodipine to 5 mg if SBP >100. Given recent cardiac cath without obstructive CAD, doubt this tis true NSTEMI.   Continue to monitor troponin for now. Mildly elevated  Continue heparin gtt pending repeat troponin   Limited echo to evaluate EF and for wall motion abnormalities  If chest pain continues, can consider cMRI to look for alternative etiologies (MINOCA). Will place order for tomorrow  2 week ziopatch on DC to rule out arrythmia given palpitations during onset of symptoms       History of Present Illness:  Trina Victoria is a 74 year old female who presented to [facility] on 2025.    This is a patient of my partner: Dr. Abad.    Last admission:  Seen in cardiology consultation for CP/NSTEMI.  No known cardiac conditions; mild HL (ADR to statins), remote smoker.  4hr episode of mod-severe heart pounding and tightness across the top of her chest 1d PTA; couldn't sleep all night, but did not call EMS.  Symptoms abated, eventually saw PCP following day, HS trop mildly elevated; sent to ER.  repeat trop 292, ECG/tele- sinus bradycardia without acute ischemic changes.  Started heparin gtt, admitted.  Currently, symptom free.  Had a similar, more severe episode 4 weeks ago- and with further recollection, remembers an even more symptomatic episode nearly 3 yrs ago (thought she had food poisoning at that time).      Patient returns with similar symptoms as 2 weeks ago.  Last  night she had an episode of chest pounding and palpitations.  This lasted all night.  Finally she took her 2.5 mg of amlodipine and it did slightly improve.  She went to [facility] and was told to come to the ER.  In the ER her troponin is mildly elevated from prior.  She denies any further chest pains.  There is no EKG changes or other lab abnormalities at this time.    Cardiology consultation was requested.    Medications:  Current Hospital Medications[1]    Past Medical History[2]    Past Surgical History[3]    Family History  There is no family history of sudden cardiac death.    Social History   reports that she has quit smoking. Her smoking use included cigarettes. She has never used smokeless tobacco. She reports that she does not drink alcohol and does not use drugs.     Allergies  Allergies[4]      Review of Systems:  Constitutional: negative for fevers  Eyes: negative for visual disturbance  Ears, nose, mouth, throat, and face: negative for epistaxis  Respiratory: negative for dyspnea on exertion  Cardiovascular: negative for chest pain  Gastrointestinal: negative for melena  Genitourinary:negative for hematuria  Hematologic/lymphatic: negative for bleeding  Musculoskeletal:negative for myalgias  Neurological: negative for dizziness and headaches  Endocrine: negative for temperature intolerance      Physical Exam:  Blood pressure 111/67, pulse 67, temperature 98.2 °F (36.8 °C), resp. rate 11, height 5' 3\" (1.6 m), weight 147 lb (66.7 kg), SpO2 98%, not currently breastfeeding.  Temp (24hrs), Av.2 °F (36.8 °C), Min:98.2 °F (36.8 °C), Max:98.2 °F (36.8 °C)    Wt Readings from Last 3 Encounters:   25 147 lb (66.7 kg)   25 151 lb 12.8 oz (68.9 kg)   23 140 lb (63.5 kg)       General: Awake and alert; in no acute distress  HEENT: Extraocular movements are intact; sclerae are anicteric; scalp is atrauamatic; no thyromegaly  Neck: Supple; no JVD; no carotid bruits  Cardiac: Regular rate and regular  rhythm; no murmurs/rubs/gallops are appreciated; PMI is non-displaced; there is no evidence of a sternal heave  Lungs: Clear to auscultation bilaterally; no accessory muscle use is noted  Abdomen: Soft, non-tender; bowel sounds are normoactive; no hepatosplenomegaly  Extremities: No clubbing or cyanosis; moves all 4 extremities normally  Psychiatric: Normal mood and affect; answers questions appropriately  Dermatologic: No rashes; normal skin turgor    Diagnostic testing:    EKG: Normal sinus rhythm    Labs:   No results found for: \"PT\", \"INR\"     Lab Results   Component Value Date    WBC 6.0 06/30/2025    HGB 13.7 06/30/2025    HCT 40.1 06/30/2025    .0 06/30/2025    CREATSERUM 1.24 06/30/2025    BUN 26 06/30/2025     06/30/2025    K 4.1 06/30/2025     06/30/2025    CO2 29.0 06/30/2025    GLU 77 06/30/2025    CA 9.8 06/30/2025    ALB 4.3 06/30/2025    ALKPHO 88 06/30/2025    BILT 0.5 06/30/2025    TP 6.9 06/30/2025    AST 23 06/30/2025    ALT 23 06/30/2025         Thank you for allowing our practice to participate in the care of your patient. Please do not hesitate to contact me if you have any questions.    Benito Aguero MD           [1]   No current facility-administered medications for this encounter.   [2]   Past Medical History:   Anxiety state    Bipolar 1 disorder (HCC)    Cancer (HCC)    skin    Hx of motion sickness    Hyperlipidemia    Hypothyroid    Renal disorder    RESOLVED    Visual impairment    GLASSES/CONTACTS   [3]   Past Surgical History:  Procedure Laterality Date    Cataract extraction w/ intraocular lens implant      Cath angio  6/19/2025    Colonoscopy N/A 9/28/2023    Procedure: COLONOSCOPY WITH FORCEP POLYPECTOMY;  Surgeon: Gamal Krishnan MD;  Location:  ENDOSCOPY    Skin surgery  03/12/2019    Mohs/L superior eyebrow BCC/ done by MM    [place] teeth removed     [4]   Allergies  Allergen Reactions    Penicillins HIVES

## 2025-06-30 NOTE — ED PROVIDER NOTES
Patient Seen in: [facility] Emergency Department        History  Chief Complaint   Patient presents with    Arrythmia/Palpitations     Stated Complaint: palpitations, here from immediate care    Subjective:   HPI          74-year-old female who presents to the emergency department complaining of having palpitations.  Says the palpitations are similar to an event that brought her to the hospital On reviewing her records on 6/18/2025 for acute chest pain.  At that time she had an elevated troponin.  Symptoms were thought to be related to vasospasm.  The patient said that last night she had recurrence of the palpitations.  No shortness of breath.  No abdominal pain.  No lightheadedness or dizziness.        Objective:     Past Medical History:    Anxiety state    Bipolar 1 disorder (HCC)    Cancer (HCC)    skin    Hx of motion sickness    Hyperlipidemia    Hypothyroid    Renal disorder    RESOLVED    Visual impairment    GLASSES/CONTACTS              Past Surgical History:   Procedure Laterality Date    Cataract extraction w/ intraocular lens implant      Cath angio  6/19/2025    Colonoscopy N/A 9/28/2023    Procedure: COLONOSCOPY WITH FORCEP POLYPECTOMY;  Surgeon: Gamal Krishnan MD;  Location:  ENDOSCOPY    Skin surgery  03/12/2019    Mohs/L superior eyebrow BCC/ done by MM    [place] teeth removed                  Social History     Socioeconomic History    Marital status: Single   Tobacco Use    Smoking status: Former     Types: Cigarettes    Smokeless tobacco: Never   Vaping Use    Vaping status: Never Used   Substance and Sexual Activity    Alcohol use: No    Drug use: No     Social Drivers of Health     Food Insecurity: No Food Insecurity (6/18/2025)    NCSS - Food Insecurity     Worried About Running Out of Food in the Last Year: No     Ran Out of Food in the Last Year: No   Transportation Needs: No Transportation Needs (6/18/2025)    NCSS - Transportation     Lack of Transportation: No   Housing  Stability: Not At Risk (6/18/2025)    NCSS - Housing/Utilities     Has Housing: Yes     Worried About Losing Housing: No     Unable to Get Utilities: No                                Physical Exam    ED Triage Vitals   BP 06/30/25 0926 111/67   Pulse 06/30/25 0926 66   Resp 06/30/25 0926 20   Temp 06/30/25 0933 98.2 °F (36.8 °C)   Temp src --    SpO2 06/30/25 0926 98 %   O2 Device 06/30/25 0926 None (Room air)       Current Vitals:   Vital Signs  BP: 102/69  Pulse: 61  Resp: 18  Temp: 98.2 °F (36.8 °C)  MAP (mmHg): 80    Oxygen Therapy  SpO2: 97 %  O2 Device: None (Room air)            Physical Exam   General: This a pleasant nontoxic appearing patient in no apparent distress alert and oriented ×3  HEENT: Pupils are equal reactive to light.  Extra ocular motions are intact.  No scleral icterus or conjunctival pallor: Neck is supple without tenderness on palpation.  Head is atraumatic normocephalic.  Oral mucosa moist.  Tongue is midline.  No posterior pharyngeal lesions.  Tympanic members are intact and clear bilaterally.  No JVD or adenopathy.  Lungs: Clear to auscultation bilaterally.  No wheezes, rhonchi, or rales appreciated.  No accessory muscle use noted for breathing.  Cardiac: Regular rate and rhythm.  Normal S1 and 2 without murmurs or ectopy appreciated  Abdomen: Soft on examination without tenderness to deep palpation or to percussion.  No masses appreciated.  Bowel sounds are normoactive.  No CVA tenderness.  Extremities: No cyanosis, no edema or clubbing.  Pulses are +2.  Full range of motion is noted of the extremities without deformities.  No tenderness.  Neurologically intact.          ED Course  Labs Reviewed   COMP METABOLIC PANEL (14) - Abnormal; Notable for the following components:       Result Value    BUN 26 (*)     Creatinine 1.24 (*)     Calculated Osmolality 302 (*)     eGFR-Cr 46 (*)     All other components within normal limits   TROPONIN I HIGH SENSITIVITY - Abnormal; Notable for the  following components:    Troponin I (High Sensitivity) 318 (*)     All other components within normal limits   LIPID PANEL - Abnormal; Notable for the following components:    HDL Cholesterol 66 (*)     All other components within normal limits   PROTHROMBIN TIME (PT) - Normal   PTT, ACTIVATED - Normal   CBC WITH DIFFERENTIAL WITH PLATELET   RAINBOW DRAW LAVENDER   RAINBOW DRAW LIGHT GREEN   RAINBOW DRAW BLUE     EKG    Rate, intervals and axes as noted on EKG Report.  Rate: 64  Rhythm: Sinus Rhythm  Reading: Normal sinus rhythm normal EKG         Narrative  PROCEDURE: XR CHEST AP PORTABLE  (CPT=71045)    INDICATIONS: palpitations, here from immediate care    COMPARISON:    FINDINGS:        The lungs and pleural spaces are clear.    Heart size is normal.    Mediastinal and hilar structures are normal.    Chest wall structures and upper abdomen are unremarkable.         Impression  CONCLUSION:    There is no evidence of active cardiopulmonary disease on this single portable chest radiograph.      Electronically Verified and Signed by Attending Radiologist: Eulogio Mandujano MD 6/30/2025 10:41 AM                       MDM   Differential diagnoses include but is not limited to cardiac ischemia, electrolyte abnormality, palpitations, heart failure.  Laboratories were sent.  Troponin was elevated at 318.  CBC was normal BUN of 26 creatinine 1.2  Admission disposition: 6/30/2025 11:24 AM       Chest x-ray performed  I reviewed the x-rays and agree with the radiologist report that showed there is no evidence of active cardiopulmonary disease on the single portable chest radiograph  I spoke to the cardiology service regarding the patient's elevated troponin.  They agreed we should heparinize the patient and have her hospitalized for continued workup.  The hospital service will be contacted.  The patient was admitted to the hospital for continued care.  Cardiology came to see the patient at the bedside.  She was heparinized  for continued care.  A total of 32 minutes of critical care time (exclusive of billable procedures) was administered to manage the patient's critical lab values due to her NSTEMI.  This involved direct patient intervention, complex decision making, and/or extensive discussions with the patient, family, and clinical staff.    Medical Decision Making      Disposition and Plan     Clinical Impression:  1. NSTEMI (non-ST elevated myocardial infarction) (HCC)         Disposition:  Admit  6/30/2025 11:24 am    Follow-up:  No follow-up provider specified.        Medications Prescribed:  Current Discharge Medication List                Supplementary Documentation:         Hospital Problems       Present on Admission  Date Reviewed: 9/26/2023          ICD-10-CM Noted POA    * (Principal) NSTEMI (non-ST elevated myocardial infarction) (HCC) I21.4 6/18/2025 Unknown    Azotemia R79.89 6/30/2025 Yes

## 2025-07-01 ENCOUNTER — APPOINTMENT (OUTPATIENT)
Dept: MRI IMAGING | Facility: HOSPITAL | Age: 75
End: 2025-07-01
Attending: INTERNAL MEDICINE
Payer: MEDICARE

## 2025-07-01 VITALS
OXYGEN SATURATION: 96 % | SYSTOLIC BLOOD PRESSURE: 104 MMHG | BODY MASS INDEX: 26.49 KG/M2 | DIASTOLIC BLOOD PRESSURE: 62 MMHG | WEIGHT: 149.5 LBS | HEIGHT: 63 IN | TEMPERATURE: 98 F | HEART RATE: 60 BPM | RESPIRATION RATE: 20 BRPM

## 2025-07-01 PROBLEM — I73.9 VASOSPASM: Status: RESOLVED | Noted: 2025-06-30 | Resolved: 2025-07-01

## 2025-07-01 PROBLEM — R79.89 AZOTEMIA: Status: RESOLVED | Noted: 2025-06-30 | Resolved: 2025-07-01

## 2025-07-01 PROBLEM — I21.4 NSTEMI (NON-ST ELEVATED MYOCARDIAL INFARCTION) (HCC): Status: RESOLVED | Noted: 2025-06-18 | Resolved: 2025-07-01

## 2025-07-01 LAB
ANION GAP SERPL CALC-SCNC: 5 MMOL/L (ref 0–18)
APTT PPP: 68.6 SECONDS (ref 23–36)
BASOPHILS # BLD AUTO: 0.07 X10(3) UL (ref 0–0.2)
BASOPHILS NFR BLD AUTO: 1.5 %
BUN BLD-MCNC: 21 MG/DL (ref 9–23)
CALCIUM BLD-MCNC: 9.2 MG/DL (ref 8.7–10.6)
CHLORIDE SERPL-SCNC: 110 MMOL/L (ref 98–112)
CO2 SERPL-SCNC: 26 MMOL/L (ref 21–32)
CREAT BLD-MCNC: 1.1 MG/DL (ref 0.55–1.02)
EGFRCR SERPLBLD CKD-EPI 2021: 53 ML/MIN/1.73M2 (ref 60–?)
EOSINOPHIL # BLD AUTO: 0.21 X10(3) UL (ref 0–0.7)
EOSINOPHIL NFR BLD AUTO: 4.4 %
ERYTHROCYTE [DISTWIDTH] IN BLOOD BY AUTOMATED COUNT: 13.3 %
GLUCOSE BLD-MCNC: 74 MG/DL (ref 70–99)
HCT VFR BLD AUTO: 35 % (ref 35–48)
HGB BLD-MCNC: 12 G/DL (ref 12–16)
IMM GRANULOCYTES # BLD AUTO: 0.01 X10(3) UL (ref 0–1)
IMM GRANULOCYTES NFR BLD: 0.2 %
LYMPHOCYTES # BLD AUTO: 2.01 X10(3) UL (ref 1–4)
LYMPHOCYTES NFR BLD AUTO: 41.8 %
MAGNESIUM SERPL-MCNC: 2.1 MG/DL (ref 1.6–2.6)
MCH RBC QN AUTO: 32.2 PG (ref 26–34)
MCHC RBC AUTO-ENTMCNC: 34.3 G/DL (ref 31–37)
MCV RBC AUTO: 93.8 FL (ref 80–100)
MONOCYTES # BLD AUTO: 0.42 X10(3) UL (ref 0.1–1)
MONOCYTES NFR BLD AUTO: 8.7 %
NEUTROPHILS # BLD AUTO: 2.09 X10 (3) UL (ref 1.5–7.7)
NEUTROPHILS # BLD AUTO: 2.09 X10(3) UL (ref 1.5–7.7)
NEUTROPHILS NFR BLD AUTO: 43.4 %
OSMOLALITY SERPL CALC.SUM OF ELEC: 294 MOSM/KG (ref 275–295)
PLATELET # BLD AUTO: 244 10(3)UL (ref 150–450)
POTASSIUM SERPL-SCNC: 3.8 MMOL/L (ref 3.5–5.1)
RBC # BLD AUTO: 3.73 X10(6)UL (ref 3.8–5.3)
SODIUM SERPL-SCNC: 141 MMOL/L (ref 136–145)
TROPONIN I SERPL HS-MCNC: 229 NG/L (ref ?–34)
WBC # BLD AUTO: 4.8 X10(3) UL (ref 4–11)

## 2025-07-01 PROCEDURE — 75561 CARDIAC MRI FOR MORPH W/DYE: CPT | Performed by: INTERNAL MEDICINE

## 2025-07-01 PROCEDURE — 85025 COMPLETE CBC W/AUTO DIFF WBC: CPT | Performed by: INTERNAL MEDICINE

## 2025-07-01 PROCEDURE — 84484 ASSAY OF TROPONIN QUANT: CPT

## 2025-07-01 PROCEDURE — 80048 BASIC METABOLIC PNL TOTAL CA: CPT | Performed by: INTERNAL MEDICINE

## 2025-07-01 PROCEDURE — 83735 ASSAY OF MAGNESIUM: CPT | Performed by: INTERNAL MEDICINE

## 2025-07-01 PROCEDURE — 85730 THROMBOPLASTIN TIME PARTIAL: CPT | Performed by: INTERNAL MEDICINE

## 2025-07-01 PROCEDURE — A9585 GADOBUTROL INJECTION: HCPCS | Performed by: INTERNAL MEDICINE

## 2025-07-01 RX ORDER — POTASSIUM CHLORIDE 1500 MG/1
40 TABLET, EXTENDED RELEASE ORAL ONCE
Status: COMPLETED | OUTPATIENT
Start: 2025-07-01 | End: 2025-07-01

## 2025-07-01 NOTE — PLAN OF CARE
Received pt at 0730.   Pt is A&Ox4, no pain. On room air, lungs are diminished, no coughing. SB, no chest pain. Continent of B&B, active bowel sounds, last BM 7-1. Heparin gtt infusing. Pt updated with plan of care.     POC  - cMRI   - ECHO?   - round with cards       Problem: Patient/Family Goals  Goal: Patient/Family Long Term Goal  Description: Patient's Long Term Goal: DC to home   Stay out of hospital 7-1    Interventions:  - Follow plan of care   - med compliance, follow ups      - See additional Care Plan goals for specific interventions  Outcome: Progressing  Goal: Patient/Family Short Term Goal  Description: Patient's Short Term Goal: feel better   No pain 7-1    Interventions:   - med. Echo, cMRI  - PRN pain meds, hot/cold packs, tell RN if in pain    - See additional Care Plan goals for specific interventions  Outcome: Progressing     Problem: CARDIOVASCULAR - ADULT  Goal: Maintains optimal cardiac output and hemodynamic stability  Description: INTERVENTIONS:  - Monitor vital signs, rhythm, and trends  - Monitor for bleeding, hypotension and signs of decreased cardiac output  - Evaluate effectiveness of vasoactive medications to optimize hemodynamic stability  - Monitor arterial and/or venous puncture sites for bleeding and/or hematoma  - Assess quality of pulses, skin color and temperature  - Assess for signs of decreased coronary artery perfusion - ex. Angina  - Evaluate fluid balance, assess for edema, trend weights  Outcome: Progressing  Goal: Absence of cardiac arrhythmias or at baseline  Description: INTERVENTIONS:  - Continuous cardiac monitoring, monitor vital signs, obtain 12 lead EKG if indicated  - Evaluate effectiveness of antiarrhythmic and heart rate control medications as ordered  - Initiate emergency measures for life threatening arrhythmias  - Monitor electrolytes and administer replacement therapy as ordered  Outcome: Progressing

## 2025-07-01 NOTE — PROGRESS NOTES
Cardiology Progress Note    Trina Victoria Patient Status:  Observation    1950 MRN [id number]   [facility] 2NE-A Attending Edy Sevilla, DO   Hosp Day # 0 PCP Linette Gentile MD     Reason for consultation:  Chest pain     Impression:  Recurrent episodes of chest pain, heart pounding: [facility] on 25  LM-ok, LAD-ok, LCx-ok, RCA- 40%, iFR = 1.0. LVEF 60-65%.   Diagnosed with coronary vasospasm from the above cath and started on amlodipine 2.5 mg   HLD  Remote smoker      Plan:   Etiology of chest pain/punding is unclear. HS trop with minimal elevation an d symptoms have resolved. cMRI is pending to rule out other etiologies of MINOCA  Continue  anlodipine to 2.5 mg. Unable to tolerate higher doses due to hypotension. Her pain does improve with CCB raising the possibility of coronary vasospasm   HS troponin peaked at 368  Stop heparin gtt   CMRI complated to rule out other etiologies of chest pain. Final read is pending   2 week ziopatch on DC to rule out arrythmia given palpitations during onset of symptoms   Follow up with Dr. Abad or CHRISTIANO in 6-8 weeks     Subjective:  The patient denies any chest pain or dyspnea at this time.    Objective:  /67 (BP Location: Right arm)   Pulse 72   Temp 97.5 °F (36.4 °C) (Oral)   Resp 20   Ht 5' 3\" (1.6 m)   Wt 149 lb 7.6 oz (67.8 kg)   SpO2 98%   BMI 26.48 kg/m²   Temp (24hrs), Av.7 °F (36.5 °C), Min:97.5 °F (36.4 °C), Max:98.3 °F (36.8 °C)      Intake/Output Summary (Last 24 hours) at 2025 1501  Last data filed at 2025 1423  Gross per 24 hour   Intake  ml   Output --   Net  ml     Wt Readings from Last 3 Encounters:   25 149 lb 7.6 oz (67.8 kg)   25 149 lb 7.6 oz (67.8 kg)   25 151 lb 12.8 oz (68.9 kg)       General: Awake and alert; in no acute distress  Cardiac: Regular rate and regular rhythm; no murmurs/rubs/gallops are appreciated  Lungs: Clear to auscultation bilaterally; no accessory muscle  use  Abdomen: Soft, non-tender; bowel sounds are normoactive  Extremities: No clubbing/cyanosis; moves all 4 extremities normally    Current Hospital Medications[1]    Laboratory Data:  Lab Results   Component Value Date    WBC 4.8 07/01/2025    HGB 12.0 07/01/2025    HCT 35.0 07/01/2025    .0 07/01/2025     Lab Results   Component Value Date    INR 0.97 06/30/2025     Lab Results   Component Value Date     07/01/2025    K 3.8 07/01/2025     07/01/2025    CO2 26.0 07/01/2025    BUN 21 07/01/2025    CREATSERUM 1.10 07/01/2025    GLU 74 07/01/2025    CA 9.2 07/01/2025    MG 2.1 07/01/2025       Telemetry: No malignant tachyarrhythmias or bradyarrhythmias      Thank you for allowing our practice to participate in the care of your patient. Please do not hesitate to contact me if you have any questions.    Benito Aguero MD           [1]   Current Facility-Administered Medications   Medication Dose Route Frequency    heparin (Porcine) 52985 units/250mL infusion ACS/AFIB CONTINUOUS  200-3,000 Units/hr Intravenous Continuous    amLODIPine (Norvasc) tab 2.5 mg  2.5 mg Oral Daily    aspirin chewable tab 81 mg  81 mg Oral Daily    atorvastatin (Lipitor) tab 40 mg  40 mg Oral Nightly    levothyroxine (Synthroid) tab 75 mcg  75 mcg Oral Daily @ 0700    QUEtiapine (SEROquel) tab 75 mg  75 mg Oral Nightly    lactated ringers infusion   Intravenous Continuous    morphINE PF 2 MG/ML injection 1 mg  1 mg Intravenous Q2H PRN    Or    morphINE PF 2 MG/ML injection 2 mg  2 mg Intravenous Q2H PRN    Or    morphINE PF 4 MG/ML injection 4 mg  4 mg Intravenous Q2H PRN    melatonin tab 3 mg  3 mg Oral Nightly PRN

## 2025-07-01 NOTE — PLAN OF CARE
Pt is ok to discharge per primary and consults. Discharge instructions including meds & follow ups given. Patient verbalizes understanding & questions answered. IV removed, tele monitor discontinued, all belongings taken with patient. Pt transported off unit via wheelchair to [facility] with friend.

## 2025-07-01 NOTE — PLAN OF CARE
Problem: Patient/Family Goals  Goal: Patient/Family Long Term Goal  Description: Patient's Long Term Goal: DC to home     Interventions:  - Follow plan of care     - See additional Care Plan goals for specific interventions  Outcome: Progressing  Goal: Patient/Family Short Term Goal  Description: Patient's Short Term Goal: feel better     Interventions:   - med. Echo, cMRI    - See additional Care Plan goals for specific interventions  Outcome: Progressing     Problem: CARDIOVASCULAR - ADULT  Goal: Maintains optimal cardiac output and hemodynamic stability  Description: INTERVENTIONS:  - Monitor vital signs, rhythm, and trends  - Monitor for bleeding, hypotension and signs of decreased cardiac output  - Evaluate effectiveness of vasoactive medications to optimize hemodynamic stability  - Monitor arterial and/or venous puncture sites for bleeding and/or hematoma  - Assess quality of pulses, skin color and temperature  - Assess for signs of decreased coronary artery perfusion - ex. Angina  - Evaluate fluid balance, assess for edema, trend weights  Outcome: Progressing  Goal: Absence of cardiac arrhythmias or at baseline  Description: INTERVENTIONS:  - Continuous cardiac monitoring, monitor vital signs, obtain 12 lead EKG if indicated  - Evaluate effectiveness of antiarrhythmic and heart rate control medications as ordered  - Initiate emergency measures for life threatening arrhythmias  - Monitor electrolytes and administer replacement therapy as ordered  Outcome: Progressing

## 2025-07-03 LAB
ATRIAL RATE: 64 BPM
P AXIS: 54 DEGREES
P-R INTERVAL: 172 MS
Q-T INTERVAL: 404 MS
QRS DURATION: 72 MS
QTC CALCULATION (BEZET): 416 MS
R AXIS: 30 DEGREES
T AXIS: 41 DEGREES
VENTRICULAR RATE: 64 BPM

## 2025-07-16 NOTE — PROGRESS NOTES
Provider Clarification     Additional information to clarify the type of documented myocardial infarction:      Type 2 NSTEMI due to vasospasm     This note is part of the patient's medical record.

## 2025-07-26 ENCOUNTER — APPOINTMENT (OUTPATIENT)
Dept: GENERAL RADIOLOGY | Facility: HOSPITAL | Age: 75
End: 2025-07-26
Attending: EMERGENCY MEDICINE

## 2025-07-26 ENCOUNTER — HOSPITAL ENCOUNTER (EMERGENCY)
Facility: HOSPITAL | Age: 75
Discharge: HOME | End: 2025-07-26
Attending: EMERGENCY MEDICINE

## 2025-07-26 VITALS
DIASTOLIC BLOOD PRESSURE: 61 MMHG | OXYGEN SATURATION: 100 % | RESPIRATION RATE: 16 BRPM | HEART RATE: 62 BPM | SYSTOLIC BLOOD PRESSURE: 94 MMHG | TEMPERATURE: 97 F

## 2025-07-26 DIAGNOSIS — R00.2 PALPITATIONS: ICD-10-CM

## 2025-07-26 DIAGNOSIS — I48.0 PAROXYSMAL ATRIAL FIBRILLATION (HCC): Primary | ICD-10-CM

## 2025-07-26 LAB
ALBUMIN SERPL-MCNC: 4.3 G/DL (ref 3.2–4.8)
ALBUMIN/GLOB SERPL: 1.5 (ref 1–2)
ALP LIVER SERPL-CCNC: 91 U/L (ref 55–142)
ALT SERPL-CCNC: 26 U/L (ref 10–49)
ANION GAP SERPL CALC-SCNC: 10 MMOL/L (ref 0–18)
AST SERPL-CCNC: 30 U/L (ref ?–34)
BASOPHILS # BLD AUTO: 0.06 X10(3) UL (ref 0–0.2)
BASOPHILS NFR BLD AUTO: 0.8 %
BILIRUB SERPL-MCNC: 0.5 MG/DL (ref 0.2–1.1)
BUN BLD-MCNC: 20 MG/DL (ref 9–23)
CALCIUM BLD-MCNC: 9.6 MG/DL (ref 8.7–10.6)
CHLORIDE SERPL-SCNC: 103 MMOL/L (ref 98–112)
CO2 SERPL-SCNC: 28 MMOL/L (ref 21–32)
CREAT BLD-MCNC: 1.29 MG/DL (ref 0.55–1.02)
EGFRCR SERPLBLD CKD-EPI 2021: 44 ML/MIN/1.73M2 (ref 60–?)
EOSINOPHIL # BLD AUTO: 0.18 X10(3) UL (ref 0–0.7)
EOSINOPHIL NFR BLD AUTO: 2.4 %
ERYTHROCYTE [DISTWIDTH] IN BLOOD BY AUTOMATED COUNT: 13.2 %
GLOBULIN PLAS-MCNC: 2.8 G/DL (ref 2–3.5)
GLUCOSE BLD-MCNC: 153 MG/DL (ref 70–99)
HCT VFR BLD AUTO: 38.9 % (ref 35–48)
HGB BLD-MCNC: 13.5 G/DL (ref 12–16)
IMM GRANULOCYTES # BLD AUTO: 0.02 X10(3) UL (ref 0–1)
IMM GRANULOCYTES NFR BLD: 0.3 %
LYMPHOCYTES # BLD AUTO: 1.87 X10(3) UL (ref 1–4)
LYMPHOCYTES NFR BLD AUTO: 24.5 %
MAGNESIUM SERPL-MCNC: 2.4 MG/DL (ref 1.6–2.6)
MCH RBC QN AUTO: 32.2 PG (ref 26–34)
MCHC RBC AUTO-ENTMCNC: 34.7 G/DL (ref 31–37)
MCV RBC AUTO: 92.8 FL (ref 80–100)
MONOCYTES # BLD AUTO: 0.46 X10(3) UL (ref 0.1–1)
MONOCYTES NFR BLD AUTO: 6 %
NEUTROPHILS # BLD AUTO: 5.03 X10 (3) UL (ref 1.5–7.7)
NEUTROPHILS # BLD AUTO: 5.03 X10(3) UL (ref 1.5–7.7)
NEUTROPHILS NFR BLD AUTO: 66 %
NT-PROBNP SERPL-MCNC: 288 PG/ML (ref ?–125)
OSMOLALITY SERPL CALC.SUM OF ELEC: 298 MOSM/KG (ref 275–295)
PLATELET # BLD AUTO: 223 10(3)UL (ref 150–450)
POTASSIUM SERPL-SCNC: 3.4 MMOL/L (ref 3.5–5.1)
PROT SERPL-MCNC: 7.1 G/DL (ref 5.7–8.2)
RBC # BLD AUTO: 4.19 X10(6)UL (ref 3.8–5.3)
SODIUM SERPL-SCNC: 141 MMOL/L (ref 136–145)
TROPONIN I SERPL HS-MCNC: 10 NG/L (ref ?–34)
TSI SER-ACNC: 1.4 UIU/ML (ref 0.55–4.78)
WBC # BLD AUTO: 7.6 X10(3) UL (ref 4–11)

## 2025-07-26 PROCEDURE — 99285 EMERGENCY DEPT VISIT HI MDM: CPT

## 2025-07-26 PROCEDURE — 84443 ASSAY THYROID STIM HORMONE: CPT | Performed by: EMERGENCY MEDICINE

## 2025-07-26 PROCEDURE — 83880 ASSAY OF NATRIURETIC PEPTIDE: CPT | Performed by: EMERGENCY MEDICINE

## 2025-07-26 PROCEDURE — 80053 COMPREHEN METABOLIC PANEL: CPT | Performed by: EMERGENCY MEDICINE

## 2025-07-26 PROCEDURE — 36415 COLL VENOUS BLD VENIPUNCTURE: CPT

## 2025-07-26 PROCEDURE — 99284 EMERGENCY DEPT VISIT MOD MDM: CPT

## 2025-07-26 PROCEDURE — 85025 COMPLETE CBC W/AUTO DIFF WBC: CPT | Performed by: EMERGENCY MEDICINE

## 2025-07-26 PROCEDURE — 83735 ASSAY OF MAGNESIUM: CPT | Performed by: EMERGENCY MEDICINE

## 2025-07-26 PROCEDURE — 93010 ELECTROCARDIOGRAM REPORT: CPT

## 2025-07-26 PROCEDURE — 71045 X-RAY EXAM CHEST 1 VIEW: CPT | Performed by: EMERGENCY MEDICINE

## 2025-07-26 PROCEDURE — 84484 ASSAY OF TROPONIN QUANT: CPT | Performed by: EMERGENCY MEDICINE

## 2025-07-26 PROCEDURE — 93005 ELECTROCARDIOGRAM TRACING: CPT

## 2025-07-26 RX ORDER — METOPROLOL SUCCINATE 25 MG/1
25 TABLET, EXTENDED RELEASE ORAL DAILY
Qty: 30 TABLET | Refills: 0 | Status: SHIPPED | OUTPATIENT
Start: 2025-07-26 | End: 2025-08-25

## 2025-07-26 NOTE — ED PROVIDER NOTES
Patient Seen in: [facility] Emergency Department        History  Chief Complaint   Patient presents with    Arrythmia/Palpitations     Stated Complaint: palpitations    Subjective:   75-year-old female, history of anxiety, Polar 1 disorder, hyperlipidemia, hypertension, presents with palpitations.  Occurred about an hour prior to arrival around 3 PM today.  Last about 20 minutes and resolved.  She is having intermittent episodes of this.  She had an NSTEMI here not too long ago and had a cardiac catheterization.  She had 2 recent admissions, also had a cardiac MRI and her workups have been benign.  States when she had a Zio patch on for several days she never had any symptoms so she never had the button to record the event.  I do see that report with some PACs and PVCs and short episodes of A-fib.  States she never had an episode while she was wearing a Zio patch so she was she would have had it on today.  Feels fine now.  Thought about leaving from the waiting room because she felt totally back to baseline.                      Objective:     Past Medical History:    Anxiety state    Bipolar 1 disorder (HCC)    Cancer (HCC)    skin    Hx of motion sickness    Hyperlipidemia    Hypothyroid    Renal disorder    RESOLVED    Visual impairment    GLASSES/CONTACTS              Past Surgical History:   Procedure Laterality Date    Cataract extraction w/ intraocular lens implant      Cath angio  6/19/2025    Colonoscopy N/A 9/28/2023    Procedure: COLONOSCOPY WITH FORCEP POLYPECTOMY;  Surgeon: Gamal Krishnan MD;  Location:  ENDOSCOPY    Skin surgery  03/12/2019    Mohs/L superior eyebrow BCC/ done by MM    [place] teeth removed                  Social History     Socioeconomic History    Marital status: Single   Tobacco Use    Smoking status: Former     Types: Cigarettes    Smokeless tobacco: Never   Vaping Use    Vaping status: Never Used   Substance and Sexual Activity    Alcohol use: No    Drug use: No      Social Drivers of Health     Food Insecurity: No Food Insecurity (6/30/2025)    NCSS - Food Insecurity     Worried About Running Out of Food in the Last Year: No     Ran Out of Food in the Last Year: No   Transportation Needs: Unmet Transportation Needs (6/30/2025)    NCSS - Transportation     Lack of Transportation: Yes   Housing Stability: Not At Risk (6/30/2025)    NCSS - Housing/Utilities     Has Housing: Yes     Worried About Losing Housing: No     Unable to Get Utilities: No   Recent Concern: Housing Stability - At Risk (6/30/2025)    NCSS - Housing/Utilities     Has Housing: No     Worried About Losing Housing: No     Unable to Get Utilities: No                                Physical Exam    ED Triage Vitals [07/26/25 1538]   /65   Pulse 79   Resp 16   Temp 97.2 °F (36.2 °C)   Temp src Temporal   SpO2 95 %   O2 Device None (Room air)       Current Vitals:   Vital Signs  BP: 94/61  Pulse: 62  Resp: 16  Temp: 97.2 °F (36.2 °C)  Temp src: Temporal  MAP (mmHg): 73    Oxygen Therapy  SpO2: 100 %  O2 Device: None (Room air)            Physical Exam  Vitals and nursing note reviewed.   Constitutional:       General: She is not in acute distress.     Appearance: She is not toxic-appearing.   HENT:      Head: Normocephalic.   Eyes:      Extraocular Movements: Extraocular movements intact.   Cardiovascular:      Rate and Rhythm: Normal rate.      Heart sounds: Normal heart sounds.   Pulmonary:      Effort: Pulmonary effort is normal.      Breath sounds: Normal breath sounds.   Abdominal:      General: There is no distension.      Palpations: Abdomen is soft.      Tenderness: There is no abdominal tenderness.   Musculoskeletal:         General: Normal range of motion.      Cervical back: Normal range of motion and neck supple.   Skin:     General: Skin is warm and dry.   Neurological:      General: No focal deficit present.      Mental Status: She is alert.   Psychiatric:         Mood and Affect: Mood normal.          Behavior: Behavior normal.                 ED Course  Labs Reviewed   COMP METABOLIC PANEL (14) - Abnormal; Notable for the following components:       Result Value    Glucose 153 (*)     Potassium 3.4 (*)     Creatinine 1.29 (*)     Calculated Osmolality 298 (*)     eGFR-Cr 44 (*)     All other components within normal limits   PRO BETA NATRIURETIC PEPTIDE - Abnormal; Notable for the following components:    Pro-Beta Natriuretic Peptide 288 (*)     All other components within normal limits   TROPONIN I HIGH SENSITIVITY - Normal   MAGNESIUM - Normal   TSH W REFLEX TO FREE T4 - Normal   CBC WITH DIFFERENTIAL WITH PLATELET   RAINBOW DRAW BLUE     EKG    Rate, intervals and axes as noted on EKG Report.  Rate: 72  Rhythm: Sinus Rhythm  Reading: EKG sinus rhythm 72 bpm.  Normal axis.  No celebrations.  , QRS 82,  ms.  Compared to June 2025, no significant changes noted    Patient placed on cardiac monitor for telemetry monitoring secondary to palpitations. Interpretation at bedside by me is sinus rhythm.                                  MDM     XR CHEST AP PORTABLE  (CPT=71045)  Result Date: 7/26/2025  CONCLUSION: Mild parabronchial thickening. No CHF. Electronically Verified and Signed by Attending Radiologist: Francisco Javier Jackson MD 7/26/2025 4:17 PM Workstation: [id number]      I independently interpreted the x-ray of the chest that any obvious signs of acute infiltrate    Differential diagnosis includes but not limited to, arrhythmia, dehydration, anxiety, A-fib, PACs, PVCs    Friend at bedside helpful to provide information on his presenting illness    External chart review demonstrates her recent cardiac admissions and workup.  Received report from a Zio patch, discussed over the phone with Dr. Jones      75-year-old female with palpitations.  Likely from paroxysmal atrial fibrillation which was seen on her Zio patch.  Less than 1% longest episode 11 minutes and 20 seconds.  Probably what she is  feeling.  Discussed with Dr. Jones, recommends low-dose beta-blocker and Eliquis.  I will give her first dose of Eliquis now.  She will stop her amlodipine and aspirin.  She can follow-up Dr. Abad next week.  She is agreeable to this.  Discussed bleeding risk.  States she is well-educated on blood thinners as her sisters are both on it.  She is comfortable with that plan, as is her friend, further workup offer considered discussed, she wants to go home, shared decision made utilized, discharged home for outpatient follow-up    Patient was screened and evaluated during this visit.  As the treating physician attending to the patient, I determined within reasonable clinical confidence and prior to discharge, that an emergency medical condition was not or was no longer present.  There was no indication for further evaluation, treatment, or admission on an emergency basis.  Comprehensive verbal and written discharge and follow-up instructions were provided to help prevent relapse or worsening.  Patient was instructed to follow-up with their primary care provider for further evaluation and treatment, return immediately to ER for worsening, concerning, new, or changing/persisting symptoms. I discussed the case with the patient and they had no questions, complaints, or concerns.  Patient was comfortable going home.     Per the discharge paperwork, patients are encouraged to and given instructions on how to sign [facility] for [facility], where they have access to their records, including any/all incidental findings.     This note was prepared using Dragon Medical voice recognition dictation software. As a result errors may occur. When identified these errors have been corrected. While every attempt is made to correct errors during dictation discrepancies may still exist    Note to patient: The 21st Century Cures Act makes medical notes like these available to patients in the interest of transparency. However, this is a medical document  intended as peer to peer communication. It is written in medical language and may contain abbreviations or verbiage that are unfamiliar. It may appear blunt or direct. Medical documents are intended to carry relevant information, facts as evident, and the clinical opinion of the practitioner.             Medical Decision Making      Disposition and Plan     Clinical Impression:  1. Paroxysmal atrial fibrillation (HCC)    2. Palpitations         Disposition:  Discharge  7/26/2025  5:33 pm    Follow-up:  José Miguel Abad MD  [address] [place] [id number] [phone]    Follow up            Medications Prescribed:  Current Discharge Medication List        START taking these medications    Details   apixaban 5 MG Oral Tab Take 1 tablet (5 mg total) by mouth 2 (two) times daily.  Qty: 60 tablet, Refills: 0      metoprolol succinate ER 25 MG Oral Tablet 24 Hr Take 1 tablet (25 mg total) by mouth daily.  Qty: 30 tablet, Refills: 0                   Supplementary Documentation:

## 2025-07-26 NOTE — ED INITIAL ASSESSMENT (HPI)
Pt arrives to ED for evaluation of heart palpations, pt states she had just eaten lunch and was sitting with a fiend when she felt her heart start to race. Pt denies SOB or CP, pt states she has been hospitalized for this but has gotten a diagnosis.

## 2025-07-27 LAB
ATRIAL RATE: 72 BPM
P AXIS: 52 DEGREES
P-R INTERVAL: 174 MS
Q-T INTERVAL: 394 MS
QRS DURATION: 82 MS
QTC CALCULATION (BEZET): 431 MS
R AXIS: 2 DEGREES
T AXIS: 56 DEGREES
VENTRICULAR RATE: 72 BPM

## (undated) DEVICE — 10FT COMBINED O2 DELIVERY/CO2 MONITORING. FILTER WITH MICROSTREAM TYPE LUER: Brand: DUAL ADULT NASAL CANNULA

## (undated) DEVICE — STERIS KITS

## (undated) DEVICE — GIJAW SINGLE-USE BIOPSY FORCEPS WITH NEEDLE: Brand: GIJAW

## (undated) DEVICE — ADAPTER AIR H2O CLN DISP FOR OLY GI E BIOGRD

## (undated) DEVICE — KIT VLV 5 PC AIR H2O SUCT BX ENDOGATOR CONN

## (undated) DEVICE — 1200CC GUARDIAN II: Brand: GUARDIAN

## (undated) DEVICE — 3M™ RED DOT™ MONITORING ELECTRODE WITH FOAM TAPE AND STICKY GEL, 50/BAG, 20/CASE, 72/PLT 2570: Brand: RED DOT™

## (undated) NOTE — LETTER
[address]  [address]  Consent for Procedure/Sedation  Date: 6/19/25         Time:     I hereby authorize Dr. Abad, my physician and his/her assistants (if applicable), which may include medical students, residents, and/or fellows, to perform the following surgical operation/ procedure and administer such anesthesia as may be determined necessary by my physician:  Operation/Procedure name (s)  Cardiac Catheterization, Left Ventricular Cineangiography, Bilateral Selective Coronary Angiography and/or Right Heart Catheterization; possible Percutaneous Transluminal Coronary Angioplasty, Coronary Atherectomy, Coronary Stent, Intracoronary Thrombolytic therapy, Antiplatelet therapy and/or Intravascular Ultrasound on [place]   2.   I recognize that during the surgical operation/procedure, unforeseen conditions may necessitate additional or different procedures than those listed above.  I, therefore, further authorize and request that the above-named surgeon, assistants, or designees perform such procedures as are, in their judgment, necessary and desirable.    3.   My surgeon/physician has discussed prior to my surgery the potential benefits, risks and side effects of this procedure; the likelihood of achieving goals; and potential problems that might occur during recuperation.  They also discussed reasonable alternatives to the procedure, including risks, benefits, and side effects related to the alternatives and risks related to not receiving this procedure.  I have had all my questions answered and I acknowledge that no guarantee has been made as to the result that may be obtained.    4.   Should the need arise during my operation/procedure, which includes change of level of care prior to discharge, I also consent to the administration of blood and/or blood products.  Further, I understand that despite careful testing and screening of blood or blood products by collecting  agencies, I may still be subject to ill effects as a result of receiving a blood transfusion and/or blood products.  The following are some, but not all, of the potential risks that can occur: fever and allergic reactions, hemolytic reactions, transmission of diseases such as Hepatitis, AIDS and Cytomegalovirus (CMV) and fluid overload.  In the event that I wish to have an autologous transfusion of my own blood, or a directed donor transfusion, I will discuss this with my physician.  Check only if Refusing Blood or Blood Products  I understand refusal of blood or blood products as deemed necessary by my physician may have serious consequences to my condition to include possible death. I hereby assume responsibility for my refusal and release the hospital, its personnel, and my physicians from any responsibility for the consequences of my refusal.          o  Refuse      5.   I authorize the use of any specimen, organs, tissues, body parts or foreign objects that may be removed from my body during the operation/procedure for diagnosis, research or teaching purposes and their subsequent disposal by hospital authorities.  I also authorize the release of specimen test results and/or written reports to my treating physician on the hospital medical staff or other referring or consulting physicians involved in my care, at the discretion of the Pathologist or my treating physician.    6.   I consent to the photographing or videotaping of the operations or procedures to be performed, including appropriate portions of my body for medical, scientific, or educational purposes, provided my identity is not revealed by the pictures or by descriptive texts accompanying them.  If the procedure has been photographed/videotaped, the surgeon will obtain the original picture, image, videotape or CD.  The hospital will not be responsible for storage, release or maintenance of the picture, image, tape or CD.    7.   I consent to the  presence of a  or observers in the operating room as deemed necessary by my physician or their designees.    8.   I recognize that in the event my procedure results in extended X-Ray/fluoroscopy time, I may develop a skin reaction.    9. If I have a Do Not Attempt Resuscitation (DNAR) order in place, that status will be suspended while in the operating room, procedural suite, and during the recovery period unless otherwise explicitly stated by me (or a person authorized to consent on my behalf). The surgeon or my attending physician will determine when the applicable recovery period ends for purposes of reinstating the DNAR order.  10. Patients having a sterilization procedure: I understand that if the procedure is successful the results will be permanent and it will therefore be impossible for me to inseminate, conceive, or bear children.  I also understand that the procedure is intended to result in sterility, although the result has not been guaranteed.   11. I acknowledge that my physician has explained sedation/analgesia administration to me including the risk and benefits I consent to the administration of sedation/analgesia as may be necessary or desirable in the judgment of my physician.    I CERTIFY THAT I HAVE READ AND FULLY UNDERSTAND THE ABOVE CONSENT TO OPERATION and/or OTHER PROCEDURE.      ____________________________________       _________________________________      ______________________________  Signature of Patient         Signature of Responsible Person        Printed Name of Responsible Person   ____________________________________      _________________________________      ______________________________       Signature of Witness          Relationship to Patient                       Date                                       Time  Patient Name: Trina SILVERMAN Julien  : 1950    Reviewed: 2024   Printed: 2025  Medical Record #: [id number] Page 1 of 1